# Patient Record
Sex: FEMALE | Race: WHITE | NOT HISPANIC OR LATINO | Employment: FULL TIME | URBAN - METROPOLITAN AREA
[De-identification: names, ages, dates, MRNs, and addresses within clinical notes are randomized per-mention and may not be internally consistent; named-entity substitution may affect disease eponyms.]

---

## 2017-01-11 ENCOUNTER — PROCEDURE VISIT (OUTPATIENT)
Dept: NEUROLOGY | Facility: CLINIC | Age: 57
End: 2017-01-11
Payer: COMMERCIAL

## 2017-01-11 VITALS
HEIGHT: 61 IN | SYSTOLIC BLOOD PRESSURE: 126 MMHG | BODY MASS INDEX: 29.38 KG/M2 | WEIGHT: 155.63 LBS | HEART RATE: 68 BPM | DIASTOLIC BLOOD PRESSURE: 82 MMHG

## 2017-01-11 DIAGNOSIS — G51.39 HEMIFACIAL SPASM: Primary | ICD-10-CM

## 2017-01-11 PROCEDURE — 64612 DESTROY NERVE FACE MUSCLE: CPT | Mod: LT,S$GLB,, | Performed by: PSYCHIATRY & NEUROLOGY

## 2017-01-11 NOTE — PROCEDURES
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  Last injections in august were "the best so far".       BOTOX injections are medically necessary for this patient, due to effect on the patient's quality of life, causing physical discomfort, social embarrassment, and disruption of occupational and daily activities.  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  Anticholinergics (artane) and benzodiazepines (klonopin) have not been effective.       PHYSICAL EXAM (FOCUSED):    Vitals:    01/11/17 1602   BP: 126/82   Pulse: 68   Weight: 70.6 kg (155 lb 10.3 oz)   Height: 5' 1" (1.549 m)     healthy, alert, no distress  Left hemifacial spasm that occurs multiple times/ minute during visit with complete left eye closure.  Mouth pulls to her left.    ASSESSMENT:  Left hemifacial spasm, appropriate for injections.    PLAN/PROCEDURE:  After explaining the most frequently reported adverse reactions in patients with primary blepharospasm and facial twitching following Botox injections were ptosis (20.8%), superficial punctate keratitis (6.3%) and eye dryness (6.3%). I answered all the patient's questions, and consent was obtained.     The goal of the injections will be to reduce spasms.  Using a 30 gauge injection needle and aseptic technique the following muscles were identified and injected with Botox.     BOTOX INJECTION PROCEDURE:   Dilution: 1ML 0.9% NORMAL SALINE  UNITS BOTOX    For hemifacial spasm: I injected a total of 35 units with 25 units around left eye (6 injections), and 5 units in left zygomaticus, 5 units in left upper lip.                    Patient supplied botulinum toxin?  no  Total amount of toxin used:  35 units  Total amount of toxin wasted:  65 " units

## 2017-04-27 ENCOUNTER — OFFICE VISIT (OUTPATIENT)
Dept: FAMILY MEDICINE | Facility: CLINIC | Age: 57
End: 2017-04-27
Payer: COMMERCIAL

## 2017-04-27 VITALS
SYSTOLIC BLOOD PRESSURE: 130 MMHG | HEART RATE: 88 BPM | TEMPERATURE: 99 F | DIASTOLIC BLOOD PRESSURE: 68 MMHG | WEIGHT: 156.31 LBS | OXYGEN SATURATION: 98 % | HEIGHT: 60 IN | BODY MASS INDEX: 30.69 KG/M2

## 2017-04-27 DIAGNOSIS — J01.90 ACUTE BACTERIAL RHINOSINUSITIS: Primary | ICD-10-CM

## 2017-04-27 DIAGNOSIS — B96.89 ACUTE BACTERIAL RHINOSINUSITIS: Primary | ICD-10-CM

## 2017-04-27 PROCEDURE — 99214 OFFICE O/P EST MOD 30 MIN: CPT | Mod: 25,S$GLB,, | Performed by: NURSE PRACTITIONER

## 2017-04-27 PROCEDURE — 96372 THER/PROPH/DIAG INJ SC/IM: CPT | Mod: S$GLB,,, | Performed by: NURSE PRACTITIONER

## 2017-04-27 PROCEDURE — 1160F RVW MEDS BY RX/DR IN RCRD: CPT | Mod: S$GLB,,, | Performed by: NURSE PRACTITIONER

## 2017-04-27 PROCEDURE — 99999 PR PBB SHADOW E&M-EST. PATIENT-LVL III: CPT | Mod: PBBFAC,,, | Performed by: NURSE PRACTITIONER

## 2017-04-27 RX ORDER — AZITHROMYCIN 250 MG/1
TABLET, FILM COATED ORAL
Qty: 6 TABLET | Refills: 0 | Status: SHIPPED | OUTPATIENT
Start: 2017-04-27 | End: 2017-05-02

## 2017-04-27 RX ORDER — METHYLPREDNISOLONE 4 MG/1
TABLET ORAL
Qty: 1 PACKAGE | Refills: 0 | Status: SHIPPED | OUTPATIENT
Start: 2017-04-27 | End: 2018-05-02

## 2017-04-27 RX ORDER — KETOROLAC TROMETHAMINE 30 MG/ML
60 INJECTION, SOLUTION INTRAMUSCULAR; INTRAVENOUS
Status: COMPLETED | OUTPATIENT
Start: 2017-04-27 | End: 2017-04-27

## 2017-04-27 RX ADMIN — KETOROLAC TROMETHAMINE 60 MG: 30 INJECTION, SOLUTION INTRAMUSCULAR; INTRAVENOUS at 05:04

## 2017-04-27 NOTE — PROGRESS NOTES
Patient Name: Vonda Driscoll    : 1960  MRN: [id number]    Subjective:  Vonda is a 57 y.o. female who presents today for     1. Sore throat, headache, nausea, sob,  Runny nose, stuffy nose resolved with afrin, also tried   claritin, sudafed, advil cough and sinus , ibuprofen    Past Medical History  Past Medical History:   Diagnosis Date    Allergy     Glaucoma     Hemifacial spasm 2005    left    Hyperlipemia        Past Surgical History  No past surgical history on file.    Family History  Family History   Problem Relation Age of Onset    Melanoma Neg Hx        Social History  Social History     Social History    Marital status:      Spouse name: N/A    Number of children: N/A    Years of education: N/A     Occupational History    . [employer]     Social History Main Topics    Smoking status: Never Smoker    Smokeless tobacco: Never Used    Alcohol use No      Comment: occassionally    Drug use: Not on file    Sexual activity: Not on file     Other Topics Concern    Are You Pregnant Or Think You May Be? No    Breast-Feeding No     Social History Narrative       Allergies  Review of patient's allergies indicates:   Allergen Reactions    Penicillins Itching    -reviewed and updated      Medications  Reviewed and updated.   Current Outpatient Prescriptions   Medication Sig Dispense Refill    alprazolam (XANAX) 0.25 MG tablet Take 1 tablet (0.25 mg total) by mouth 2 (two) times daily as needed for Anxiety. 60 tablet 4    lovastatin (MEVACOR) 20 MG tablet       LUMIGAN 0.01 % Drop       azithromycin (Z-DANA) 250 MG tablet Take 2 tablets by mouth on day 1; Take 1 tablet by mouth on days 2-5 6 tablet 0    methylPREDNISolone (MEDROL DOSEPACK) 4 mg tablet use as directed 1 Package 0     Current Facility-Administered Medications   Medication Dose Route Frequency Provider Last Rate Last Dose    onabotulinumtoxina injection 100 Units  1 vial Intramuscular Q90 Days Beth Mayorga MD    100 Units at 01/11/17 1628         Review of Systems   Constitutional: Negative for chills and fever.   HENT: Positive for congestion (improving) and sore throat. Negative for ear pain.    Respiratory: Positive for cough and shortness of breath.    Cardiovascular: Negative for chest pain.   Neurological: Positive for headaches.         Physical Exam  /68 (BP Location: Left arm, Patient Position: Sitting, BP Method: Manual)  Pulse 88  Temp 98.5 °F (36.9 °C) (Oral)   Ht 5' (1.524 m)  Wt 70.9 kg (156 lb 4.9 oz)  LMP 04/01/2017  SpO2 98%  BMI 30.53 kg/m2  Physical Exam   Constitutional: She appears well-developed. No distress.   HENT:   Head: Normocephalic.   Right Ear: A middle ear effusion is present.   Left Ear: A middle ear effusion is present.   Nose: Mucosal edema present. Right sinus exhibits maxillary sinus tenderness.   Mouth/Throat: No posterior oropharyngeal edema or posterior oropharyngeal erythema.   Cardiovascular: Normal rate, regular rhythm and normal heart sounds.    Pulmonary/Chest: Effort normal and breath sounds normal.   Skin: She is not diaphoretic.         Assessment/Plan:  Vonda Driscoll is a 57 y.o. female who presents today for :    Acute bacterial rhinosinusitis  Hydration, continue symptom management  -     methylPREDNISolone (MEDROL DOSEPACK) 4 mg tablet; use as directed  Dispense: 1 Package; Refill: 0  -     azithromycin (Z-DANA) 250 MG tablet; Take 2 tablets by mouth on day 1; Take 1 tablet by mouth on days 2-5  Dispense: 6 tablet; Refill: 0  -     ketorolac injection 60 mg; Inject 2 mLs (60 mg total) into the muscle one time.      Return if symptoms worsen or fail to improve in 3-5 days.

## 2017-04-27 NOTE — MR AVS SNAPSHOT
[facility]  [address]  [place] [address]  Phone: [phone]  Fax: [phone]                  Vonda Driscoll   2017 5:00 PM   Office Visit    Description:  Female : 1960   Provider:  Pili Brooks NP   Department:  [facility]           Reason for Visit     Sore Throat     Nasal Congestion     Nausea     Headache           Diagnoses this Visit        Comments    Acute bacterial rhinosinusitis    -  Primary            To Do List           Goals (5 Years of Data)     None      Follow-Up and Disposition     Return if symptoms worsen or fail to improve in 3-5 days.       These Medications        Disp Refills Start End    methylPREDNISolone (MEDROL DOSEPACK) 4 mg tablet 1 Package 0 2017     use as directed    Pharmacy: [facility] - [place], [place] - [address] Ph #: [phone]       azithromycin (Z-DANA) 250 MG tablet 6 tablet 0 2017    Take 2 tablets by mouth on day 1; Take 1 tablet by mouth on days 2-5    Pharmacy: [address], [place] [address] Ph #: [phone]         [facility]     [facility] [place] [facility] -  Assistance  Unless otherwise directed by your provider, please contact [facility], our nurse care line that is available for  assistance.     Registered nurses in the [facility] provide: appointment scheduling, clinical advisement, health education, and other advisory services.  Call: [phone] (toll free)               Medications           Message regarding Medications     Verify the changes and/or additions to your medication regime listed below are the same as discussed with your clinician today.  If any of these changes or additions are incorrect, please notify your healthcare provider.        START taking these NEW medications        Refills    methylPREDNISolone (MEDROL DOSEPACK) 4 mg tablet 0    Sig: use as  directed    Class: Normal    azithromycin (Z-DANA) 250 MG tablet 0    Sig: Take 2 tablets by mouth on day 1; Take 1 tablet by mouth on days 2-5    Class: Normal      These medications were administered today        Dose Freq    ketorolac injection 60 mg 60 mg Clinic/HOD 1 time    Sig: Inject 2 mLs (60 mg total) into the muscle one time.    Class: Normal    Route: Intramuscular           Verify that the below list of medications is an accurate representation of the medications you are currently taking.  If none reported, the list may be blank. If incorrect, please contact your healthcare provider. Carry this list with you in case of emergency.           Current Medications     alprazolam (XANAX) 0.25 MG tablet Take 1 tablet (0.25 mg total) by mouth 2 (two) times daily as needed for Anxiety.    lovastatin (MEVACOR) 20 MG tablet     LUMIGAN 0.01 % Drop     azithromycin (Z-DANA) 250 MG tablet Take 2 tablets by mouth on day 1; Take 1 tablet by mouth on days 2-5    methylPREDNISolone (MEDROL DOSEPACK) 4 mg tablet use as directed           Clinical Reference Information           Your Vitals Were     BP Pulse Temp Height Weight Last Period    130/68 (BP Location: Left arm, Patient Position: Sitting, BP Method: Manual) 88 98.5 °F (36.9 °C) (Oral) 5' (1.524 m) 70.9 kg (156 lb 4.9 oz) 04/01/2017    SpO2 BMI             98% 30.53 kg/m2         Blood Pressure          Most Recent Value    BP  130/68      Allergies as of 4/27/2017     Penicillins      Immunizations Administered on Date of Encounter - 4/27/2017     None      Language Assistance Services     ATTENTION: Language assistance services are available, free of charge. Please call [phone].      ATENCIÓN: Si habla roberto, tiene a fontaine disposición servicios gratuitos de asistencia lingüística. Llame al [phone].     CHÚ Ý: N?u b?n nói Ti?ng Vi?t, có các d?ch v? h? tr? ngôn ng? mi?n phí dành cho b?n. G?i s? [phone].         [facility] complies  with applicable Federal civil rights laws and does not discriminate on the basis of race, color, national origin, age, disability, or sex.

## 2017-06-05 ENCOUNTER — PATIENT MESSAGE (OUTPATIENT)
Dept: NEUROLOGY | Facility: CLINIC | Age: 57
End: 2017-06-05

## 2017-06-20 ENCOUNTER — TELEPHONE (OUTPATIENT)
Dept: NEUROLOGY | Facility: CLINIC | Age: 57
End: 2017-06-20

## 2017-06-20 NOTE — TELEPHONE ENCOUNTER
----- Message from Ashely Rubio sent at 6/20/2017  9:21 AM CDT -----  Contact: patient  Patient needs to speak with the nurse about coming in for an injection for Botox.  Patient stated that she's tried twice by [facility] to get an appointment but no one responds to her & she was not sure why.  Patient's # is [phone]

## 2017-06-28 ENCOUNTER — PROCEDURE VISIT (OUTPATIENT)
Dept: NEUROLOGY | Facility: CLINIC | Age: 57
End: 2017-06-28
Payer: COMMERCIAL

## 2017-06-28 VITALS
WEIGHT: 160.25 LBS | SYSTOLIC BLOOD PRESSURE: 141 MMHG | HEIGHT: 60 IN | DIASTOLIC BLOOD PRESSURE: 92 MMHG | HEART RATE: 78 BPM | BODY MASS INDEX: 31.46 KG/M2

## 2017-06-28 DIAGNOSIS — G51.39 HEMIFACIAL SPASM: Primary | ICD-10-CM

## 2017-06-28 PROCEDURE — 64612 DESTROY NERVE FACE MUSCLE: CPT | Mod: LT,S$GLB,, | Performed by: PSYCHIATRY & NEUROLOGY

## 2017-06-28 NOTE — PROCEDURES
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  Last injections in January.    BOTOX injections are medically necessary for this patient, due to effect on the patient's quality of life, causing physical discomfort, social embarrassment, and disruption of occupational and daily activities.  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  Anticholinergics (artane) and benzodiazepines (klonopin) have not been effective.       PHYSICAL EXAM (FOCUSED):    Vitals:    06/28/17 0933   BP: (!) 141/92   Pulse: 78   Weight: 72.7 kg (160 lb 4.4 oz)   Height: 5' (1.524 m)     healthy, alert, no distress  Left hemifacial spasm that occurs multiple times/ minute during visit with complete left eye closure.  Mouth pulls to her left.    ASSESSMENT:  Left hemifacial spasm, appropriate for injections.    PLAN/PROCEDURE:  After explaining the most frequently reported adverse reactions in patients with primary blepharospasm and facial twitching following Botox injections were ptosis (20.8%), superficial punctate keratitis (6.3%) and eye dryness (6.3%). I answered all the patient's questions, and consent was obtained.     The goal of the injections will be to reduce spasms.  Using a 30 gauge injection needle and aseptic technique the following muscles were identified and injected with Botox.     BOTOX INJECTION PROCEDURE:   Dilution: 1ML 0.9% NORMAL SALINE  UNITS BOTOX    For hemifacial spasm: I injected a total of 35 units with 25 units around left eye (6 injections), and 5 units in left zygomaticus, 5 units in left upper lip.                    Patient supplied botulinum toxin?  no  Total amount of toxin used:  35 units  Total amount of toxin wasted:  65 units

## 2017-09-25 ENCOUNTER — TELEPHONE (OUTPATIENT)
Dept: NEUROLOGY | Facility: CLINIC | Age: 57
End: 2017-09-25

## 2017-09-25 NOTE — TELEPHONE ENCOUNTER
----- Message from Bryce Mays sent at 9/25/2017  3:27 PM CDT -----  Contact: Self @ [phone]  Pt would like to schedule botox injection. Pls call.

## 2017-10-05 ENCOUNTER — PATIENT MESSAGE (OUTPATIENT)
Dept: NEUROLOGY | Facility: CLINIC | Age: 57
End: 2017-10-05

## 2017-10-09 ENCOUNTER — PATIENT MESSAGE (OUTPATIENT)
Dept: NEUROLOGY | Facility: CLINIC | Age: 57
End: 2017-10-09

## 2017-10-19 ENCOUNTER — PROCEDURE VISIT (OUTPATIENT)
Dept: NEUROLOGY | Facility: CLINIC | Age: 57
End: 2017-10-19
Payer: COMMERCIAL

## 2017-10-19 VITALS
WEIGHT: 152.31 LBS | HEART RATE: 73 BPM | HEIGHT: 61 IN | BODY MASS INDEX: 28.76 KG/M2 | SYSTOLIC BLOOD PRESSURE: 132 MMHG | DIASTOLIC BLOOD PRESSURE: 85 MMHG

## 2017-10-19 DIAGNOSIS — G51.39 HEMIFACIAL SPASM: Primary | ICD-10-CM

## 2017-10-19 PROCEDURE — 64612 DESTROY NERVE FACE MUSCLE: CPT | Mod: LT,S$GLB,, | Performed by: PSYCHIATRY & NEUROLOGY

## 2017-10-19 RX ORDER — TRAVOPROST 0.04 MG/ML
0 SOLUTION/ DROPS OPHTHALMIC DAILY
COMMUNITY
Start: 2017-08-08 | End: 2020-04-15

## 2017-10-19 NOTE — PROCEDURES
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  Last injections in June.    BOTOX injections are medically necessary for this patient, due to effect on the patient's quality of life, causing physical discomfort, social embarrassment, and disruption of occupational and daily activities.  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  Anticholinergics (artane) and benzodiazepines (klonopin) have not been effective.       PHYSICAL EXAM (FOCUSED):    Vitals:    10/19/17 1608   BP: 132/85   Pulse: 73   Weight: 69.1 kg (152 lb 5.4 oz)   Height: 5' 1" (1.549 m)     healthy, alert, no distress  Left hemifacial spasm that occurs multiple times/ minute during visit with complete left eye closure.  Mouth pulls to her left.    ASSESSMENT:  Left hemifacial spasm, appropriate for injections.    PLAN/PROCEDURE:  After explaining the most frequently reported adverse reactions in patients with primary blepharospasm and facial twitching following Botox injections were ptosis (20.8%), superficial punctate keratitis (6.3%) and eye dryness (6.3%). I answered all the patient's questions, and consent was obtained.     The goal of the injections will be to reduce spasms.  Using a 30 gauge injection needle and aseptic technique the following muscles were identified and injected with Botox.     BOTOX INJECTION PROCEDURE:   Dilution: 1ML 0.9% NORMAL SALINE  UNITS BOTOX    For hemifacial spasm: I injected a total of 35 units with 20 units around left eye (5 injections), and 5 units in left zygomaticus, 2 units in left upper lip, 3 in lower lip and 5 in nasalis.                        Patient supplied botulinum toxin?  no  Total amount of toxin used:  35 units  Total amount of toxin wasted:  65 " units

## 2018-01-17 ENCOUNTER — TELEPHONE (OUTPATIENT)
Dept: NEUROLOGY | Facility: CLINIC | Age: 58
End: 2018-01-17

## 2018-01-17 NOTE — TELEPHONE ENCOUNTER
Pt has accepted a r/s appt for 1/30/18 @ 4pm with Dr. Mayorga for Botox. Sooner appt offered while Dr. Mayorga on rounds but patient needed appt later in the day. New appt letter mailed.

## 2018-01-30 ENCOUNTER — TELEPHONE (OUTPATIENT)
Dept: NEUROLOGY | Facility: CLINIC | Age: 58
End: 2018-01-30

## 2018-01-30 ENCOUNTER — PROCEDURE VISIT (OUTPATIENT)
Dept: NEUROLOGY | Facility: CLINIC | Age: 58
End: 2018-01-30
Payer: COMMERCIAL

## 2018-01-30 VITALS
WEIGHT: 154.13 LBS | SYSTOLIC BLOOD PRESSURE: 131 MMHG | BODY MASS INDEX: 29.1 KG/M2 | DIASTOLIC BLOOD PRESSURE: 90 MMHG | HEART RATE: 83 BPM | HEIGHT: 61 IN

## 2018-01-30 DIAGNOSIS — G51.39 HEMIFACIAL SPASM: Primary | ICD-10-CM

## 2018-01-30 PROCEDURE — 64612 DESTROY NERVE FACE MUSCLE: CPT | Mod: LT,S$GLB,, | Performed by: PSYCHIATRY & NEUROLOGY

## 2018-01-30 NOTE — TELEPHONE ENCOUNTER
came into the clinic today for her Botox appt with . She stated that she would like her next appt to not be at the end of the month, but still wants it at the end of the day.

## 2018-01-30 NOTE — PROCEDURES
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  Last injections in October and worked well, though she's reluctant to get too much around mouth as it distorts her smile.    BOTOX injections are medically necessary for this patient, due to effect on the patient's quality of life, causing physical discomfort, social embarrassment, and disruption of occupational and daily activities.  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  Anticholinergics (artane) and benzodiazepines (klonopin) have not been effective.       PHYSICAL EXAM (FOCUSED):    Vitals:    01/30/18 1616   BP: (!) 131/90   Pulse: 83   Weight: 69.9 kg (154 lb 1.6 oz)   Height: 5' 1" (1.549 m)     healthy, alert, no distress  Left hemifacial spasm that occurs multiple times/ minute during visit with complete left eye closure.  Mouth pulls to her left.    ASSESSMENT:  Left hemifacial spasm, appropriate for injections.    PLAN/PROCEDURE:  After explaining the most frequently reported adverse reactions in patients with primary blepharospasm and facial twitching following Botox injections were ptosis (20.8%), superficial punctate keratitis (6.3%) and eye dryness (6.3%). I answered all the patient's questions, and consent was obtained.     The goal of the injections will be to reduce spasms.  Using a 30 gauge injection needle and aseptic technique the following muscles were identified and injected with Botox.     BOTOX INJECTION PROCEDURE:   Dilution: 1ML 0.9% NORMAL SALINE  UNITS BOTOX    For hemifacial spasm: I injected a total of 30 units with 25 units around left eye (5 injections), and 5 units in left upper lip.                            Patient supplied botulinum toxin?  no  Total amount of toxin used:  30 units  Total " amount of toxin wasted:  70 units

## 2018-02-05 ENCOUNTER — TELEPHONE (OUTPATIENT)
Dept: NEUROLOGY | Facility: CLINIC | Age: 58
End: 2018-02-05

## 2018-05-02 ENCOUNTER — PROCEDURE VISIT (OUTPATIENT)
Dept: NEUROLOGY | Facility: CLINIC | Age: 58
End: 2018-05-02
Payer: COMMERCIAL

## 2018-05-02 VITALS
HEIGHT: 61 IN | WEIGHT: 156.75 LBS | HEART RATE: 80 BPM | DIASTOLIC BLOOD PRESSURE: 84 MMHG | SYSTOLIC BLOOD PRESSURE: 134 MMHG | BODY MASS INDEX: 29.59 KG/M2

## 2018-05-02 DIAGNOSIS — G51.39 HEMIFACIAL SPASM: Primary | ICD-10-CM

## 2018-05-02 PROCEDURE — 64612 DESTROY NERVE FACE MUSCLE: CPT | Mod: LT,S$GLB,, | Performed by: PSYCHIATRY & NEUROLOGY

## 2018-05-02 NOTE — PROCEDURES
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  Last injections in January and worked well, though she's reluctant to get too much around mouth as it distorts her smile.    BOTOX injections are medically necessary for this patient, due to effect on the patient's quality of life, causing physical discomfort, social embarrassment, and disruption of occupational and daily activities.  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  Anticholinergics (artane) and benzodiazepines (klonopin) have not been effective.       PHYSICAL EXAM (FOCUSED):    Vitals:    05/02/18 1555   BP: 134/84   Pulse: 80   Weight: 71.1 kg (156 lb 12 oz)   Height: 5' 1" (1.549 m)     healthy, alert, no distress  Left hemifacial spasm that occurs multiple times/ minute during visit with complete left eye closure.  Mouth pulls to her left.    ASSESSMENT:  Left hemifacial spasm, appropriate for injections.    PLAN/PROCEDURE:  After explaining the most frequently reported adverse reactions in patients with primary blepharospasm and facial twitching following Botox injections were ptosis (20.8%), superficial punctate keratitis (6.3%) and eye dryness (6.3%). I answered all the patient's questions, and consent was obtained.     The goal of the injections will be to reduce spasms.  Using a 30 gauge injection needle and aseptic technique the following muscles were identified and injected with Botox.     BOTOX INJECTION PROCEDURE:   Dilution: 1ML 0.9% NORMAL SALINE  UNITS BOTOX    For hemifacial spasm: I injected a total of 30 units with 25 units around left eye (5 injections), and 5 units in left upper lip.                            Patient supplied botulinum toxin?  no  Total amount of toxin used:  30 units  Total " amount of toxin wasted:  70 units

## 2018-08-15 ENCOUNTER — PROCEDURE VISIT (OUTPATIENT)
Dept: NEUROLOGY | Facility: CLINIC | Age: 58
End: 2018-08-15
Payer: COMMERCIAL

## 2018-08-15 VITALS
SYSTOLIC BLOOD PRESSURE: 136 MMHG | HEIGHT: 61 IN | DIASTOLIC BLOOD PRESSURE: 89 MMHG | HEART RATE: 78 BPM | BODY MASS INDEX: 29.18 KG/M2 | WEIGHT: 154.56 LBS

## 2018-08-15 DIAGNOSIS — G51.39 HEMIFACIAL SPASM: Primary | ICD-10-CM

## 2018-08-15 PROCEDURE — 64612 DESTROY NERVE FACE MUSCLE: CPT | Mod: LT,S$GLB,, | Performed by: PSYCHIATRY & NEUROLOGY

## 2018-08-15 RX ORDER — DIAZEPAM 2 MG/1
2 TABLET ORAL
Qty: 3 TABLET | Refills: 0 | Status: SHIPPED | OUTPATIENT
Start: 2018-08-15 | End: 2019-04-03 | Stop reason: SDUPTHER

## 2018-08-15 NOTE — PROCEDURES
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  Last injections in May did not work as well as prior injections in January (both were 40 units).    BOTOX injections are medically necessary for this patient, due to effect on the patient's quality of life, causing physical discomfort, social embarrassment, and disruption of occupational and daily activities.  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  Anticholinergics (artane) and benzodiazepines (klonopin) have not been effective.       PHYSICAL EXAM (FOCUSED):    Vitals:    08/15/18 1559   BP: 136/89   Pulse: 78   Weight: 70.1 kg (154 lb 8.7 oz)   Height: 5' 1" (1.549 m)     healthy, alert, no distress  Left hemifacial spasm that occurs multiple times/ minute during visit with complete left eye closure.  Mouth pulls to her left.    ASSESSMENT:  Left hemifacial spasm, appropriate for injections.    PLAN/PROCEDURE:  After explaining the most frequently reported adverse reactions in patients with primary blepharospasm and facial twitching following Botox injections were ptosis (20.8%), superficial punctate keratitis (6.3%) and eye dryness (6.3%). I answered all the patient's questions, and consent was obtained.     The goal of the injections will be to reduce spasms.  Using a 30 gauge injection needle and aseptic technique the following muscles were identified and injected with Botox.     BOTOX INJECTION PROCEDURE:   Dilution: 1ML 0.9% NORMAL SALINE  UNITS BOTOX    For hemifacial spasm: I injected a total of 40 units distributed around left eye.                                    Patient supplied botulinum toxin?  no  Total amount of toxin used:  40 units  Total amount of toxin wasted:  60 units                          "

## 2018-11-13 ENCOUNTER — TELEPHONE (OUTPATIENT)
Dept: NEUROLOGY | Facility: CLINIC | Age: 58
End: 2018-11-13

## 2018-11-13 DIAGNOSIS — G51.39 HEMIFACIAL SPASM: Primary | ICD-10-CM

## 2018-11-13 NOTE — TELEPHONE ENCOUNTER
Call placed to patient that appointment will need to be rescheduled due to insurance requiring more time to approve medical necessity for Botox. Left message for return call to office.

## 2018-11-14 ENCOUNTER — TELEPHONE (OUTPATIENT)
Dept: NEUROLOGY | Facility: CLINIC | Age: 58
End: 2018-11-14

## 2018-11-14 NOTE — TELEPHONE ENCOUNTER
Late Entry -- 08:25 AM - Call received from patient, advised patient Botox case still in approval process by insurance company as more information was needed. Advised patient as soon as approved, Dr. Mayorga will fit patient in for injections. Patient agreed and verbalized understanding.

## 2018-12-01 ENCOUNTER — PATIENT MESSAGE (OUTPATIENT)
Dept: NEUROLOGY | Facility: CLINIC | Age: 58
End: 2018-12-01

## 2018-12-04 ENCOUNTER — PATIENT MESSAGE (OUTPATIENT)
Dept: NEUROLOGY | Facility: CLINIC | Age: 58
End: 2018-12-04

## 2018-12-17 ENCOUNTER — PROCEDURE VISIT (OUTPATIENT)
Dept: NEUROLOGY | Facility: CLINIC | Age: 58
End: 2018-12-17
Payer: COMMERCIAL

## 2018-12-17 VITALS
HEIGHT: 61 IN | BODY MASS INDEX: 30.26 KG/M2 | WEIGHT: 160.25 LBS | SYSTOLIC BLOOD PRESSURE: 146 MMHG | HEART RATE: 65 BPM | DIASTOLIC BLOOD PRESSURE: 90 MMHG

## 2018-12-17 DIAGNOSIS — G51.39 HEMIFACIAL SPASM: Primary | ICD-10-CM

## 2018-12-17 PROCEDURE — 64612 DESTROY NERVE FACE MUSCLE: CPT | Mod: RT,S$GLB,, | Performed by: PSYCHIATRY & NEUROLOGY

## 2018-12-17 RX ORDER — PRAVASTATIN SODIUM 20 MG/1
20 TABLET ORAL DAILY
COMMUNITY
Start: 2018-12-10

## 2018-12-17 NOTE — PROCEDURES
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  Last injections in September.    BOTOX injections are medically necessary for this patient, due to effect on the patient's quality of life, causing physical discomfort, social embarrassment, and disruption of occupational and daily activities.  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  Anticholinergics (artane) and benzodiazepines (klonopin) have not been effective.       PHYSICAL EXAM (FOCUSED):    There were no vitals filed for this visit.  healthy, alert, no distress  Left hemifacial spasm that occurs multiple times/ minute during visit with complete left eye closure.  Mouth pulls to her left.    ASSESSMENT:  Left hemifacial spasm, appropriate for injections.    PLAN/PROCEDURE:  After explaining the most frequently reported adverse reactions in patients with primary blepharospasm and facial twitching following Botox injections were ptosis (20.8%), superficial punctate keratitis (6.3%) and eye dryness (6.3%). I answered all the patient's questions, and consent was obtained.     The goal of the injections will be to reduce spasms.  Using a 30 gauge injection needle and aseptic technique the following muscles were identified and injected with Botox.     BOTOX INJECTION PROCEDURE:   Dilution: 1ML 0.9% NORMAL SALINE  UNITS BOTOX    For hemifacial spasm: I injected a total of 35 units distributed around left eye.                                    Patient supplied botulinum toxin?  no  Total amount of toxin used:  35 units  Total amount of toxin wasted:  65 units

## 2019-01-17 ENCOUNTER — PATIENT MESSAGE (OUTPATIENT)
Dept: NEUROLOGY | Facility: CLINIC | Age: 59
End: 2019-01-17

## 2019-04-03 ENCOUNTER — PROCEDURE VISIT (OUTPATIENT)
Dept: NEUROLOGY | Facility: CLINIC | Age: 59
End: 2019-04-03
Payer: COMMERCIAL

## 2019-04-03 VITALS
DIASTOLIC BLOOD PRESSURE: 91 MMHG | HEART RATE: 80 BPM | SYSTOLIC BLOOD PRESSURE: 132 MMHG | WEIGHT: 160.94 LBS | BODY MASS INDEX: 30.39 KG/M2 | HEIGHT: 61 IN

## 2019-04-03 DIAGNOSIS — G51.39 HEMIFACIAL SPASM: Primary | ICD-10-CM

## 2019-04-03 PROCEDURE — 64612 DESTROY NERVE FACE MUSCLE: CPT | Mod: LT,S$GLB,, | Performed by: PSYCHIATRY & NEUROLOGY

## 2019-04-03 PROCEDURE — 64612 PR DEST,NERVE,FACIAL: ICD-10-PCS | Mod: LT,S$GLB,, | Performed by: PSYCHIATRY & NEUROLOGY

## 2019-04-03 RX ORDER — DIAZEPAM 5 MG/1
5 TABLET ORAL
Qty: 3 TABLET | Refills: 0 | Status: SHIPPED | OUTPATIENT
Start: 2019-04-03 | End: 2019-07-02

## 2019-04-03 NOTE — PROGRESS NOTES
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  Last injections in December.    BOTOX injections are medically necessary for this patient, due to effect on the patient's quality of life, causing physical discomfort, social embarrassment, and disruption of occupational and daily activities.  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  Anticholinergics (artane) and benzodiazepines (klonopin) have not been effective.     MRI brain w/wo contrast 3/19/19:  Small vessel disease      PHYSICAL EXAM (FOCUSED):    There were no vitals filed for this visit.  healthy, alert, no distress  Left hemifacial spasm that occurs multiple times/ minute during visit with complete left eye closure.  Mouth pulls to her left.    ASSESSMENT:  Left hemifacial spasm, appropriate for injections.    PLAN/PROCEDURE:  After explaining the most frequently reported adverse reactions in patients with primary blepharospasm and facial twitching following Botox injections were ptosis (20.8%), superficial punctate keratitis (6.3%) and eye dryness (6.3%). I answered all the patient's questions, and consent was obtained.     The goal of the injections will be to reduce spasms.  Using a 30 gauge injection needle and aseptic technique the following muscles were identified and injected with Botox.     BOTOX INJECTION PROCEDURE:   Dilution: 1ML 0.9% NORMAL SALINE  UNITS BOTOX    For hemifacial spasm: I injected a total of 35 units distributed around left eye.                                    Patient supplied botulinum toxin?  no  Total amount of toxin used:  35 units  Total amount of toxin wasted:  65 units

## 2019-04-03 NOTE — PROCEDURES
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  Last injections in December.  Wearing off in past could weeks.    BOTOX injections are medically necessary for this patient, due to effect on the patient's quality of life, causing physical discomfort, social embarrassment, and disruption of occupational and daily activities.  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  Anticholinergics (artane) and benzodiazepines (klonopin) have not been effective.       PHYSICAL EXAM (FOCUSED):    Vitals:    04/03/19 1619   BP: (!) 132/91   Pulse: 80   Weight: 73 kg (160 lb 15 oz)   Height: 5' 1" (1.549 m)     healthy, alert, no distress  Left hemifacial spasm that occurs multiple times/ minute during visit with complete left eye closure.  Mouth pulls to her left.    ASSESSMENT:  Left hemifacial spasm, appropriate for injections.    PLAN/PROCEDURE:  After explaining the most frequently reported adverse reactions in patients with primary blepharospasm and facial twitching following Botox injections were ptosis (20.8%), superficial punctate keratitis (6.3%) and eye dryness (6.3%). I answered all the patient's questions, and consent was obtained.     The goal of the injections will be to reduce spasms.  Using a 30 gauge injection needle and aseptic technique the following muscles were identified and injected with Botox.     BOTOX INJECTION PROCEDURE:   Dilution: 1ML 0.9% NORMAL SALINE  UNITS BOTOX    For hemifacial spasm: I injected a total of 35 units distributed around left eye.                                    Patient supplied botulinum toxin?  no  Total amount of toxin used:  35 units  Total amount of toxin wasted:  65 units                          "

## 2019-06-13 ENCOUNTER — HOSPITAL ENCOUNTER (OUTPATIENT)
Dept: RADIOLOGY | Facility: HOSPITAL | Age: 59
Discharge: HOME | End: 2019-06-13
Attending: PSYCHIATRY & NEUROLOGY
Payer: COMMERCIAL

## 2019-06-13 DIAGNOSIS — G51.39 HEMIFACIAL SPASM: ICD-10-CM

## 2019-06-13 PROCEDURE — 70544 MR ANGIOGRAPHY HEAD W/O DYE: CPT | Mod: 26,,, | Performed by: RADIOLOGY

## 2019-06-13 PROCEDURE — 70544 MRA BRAIN WITHOUT CONTRAST: ICD-10-PCS | Mod: 26,,, | Performed by: RADIOLOGY

## 2019-06-13 PROCEDURE — 70544 MR ANGIOGRAPHY HEAD W/O DYE: CPT | Mod: TC

## 2019-07-02 ENCOUNTER — PROCEDURE VISIT (OUTPATIENT)
Dept: NEUROLOGY | Facility: CLINIC | Age: 59
End: 2019-07-02
Payer: COMMERCIAL

## 2019-07-02 VITALS
HEIGHT: 61 IN | SYSTOLIC BLOOD PRESSURE: 131 MMHG | WEIGHT: 161 LBS | DIASTOLIC BLOOD PRESSURE: 88 MMHG | HEART RATE: 71 BPM | BODY MASS INDEX: 30.4 KG/M2

## 2019-07-02 DIAGNOSIS — G51.39 HEMIFACIAL SPASM: Primary | ICD-10-CM

## 2019-07-02 PROCEDURE — 64612 DESTROY NERVE FACE MUSCLE: CPT | Mod: LT,S$GLB,, | Performed by: PSYCHIATRY & NEUROLOGY

## 2019-07-02 PROCEDURE — 64612 PR DEST,NERVE,FACIAL: ICD-10-PCS | Mod: LT,S$GLB,, | Performed by: PSYCHIATRY & NEUROLOGY

## 2019-07-02 NOTE — PROCEDURES
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  Last injections in APRIL.  Wearing off in past couple weeks.    BOTOX injections are medically necessary for this patient, due to effect on the patient's quality of life, causing physical discomfort, social embarrassment, and disruption of occupational and daily activities.  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  Anticholinergics (artane) and benzodiazepines (klonopin) have not been effective.       PHYSICAL EXAM (FOCUSED):    Vitals:    07/02/19 1543   BP: 131/88   Pulse: 71   Weight: 73 kg (161 lb)   Height: 5' 1" (1.549 m)     healthy, alert, no distress  Left hemifacial spasm that occurs multiple times/ minute during visit with complete left eye closure.  Mouth pulls to her left.    ASSESSMENT:  Left hemifacial spasm, appropriate for injections.    PLAN/PROCEDURE:  After explaining the most frequently reported adverse reactions in patients with primary blepharospasm and facial twitching following Botox injections were ptosis (20.8%), superficial punctate keratitis (6.3%) and eye dryness (6.3%). I answered all the patient's questions, and consent was obtained.     The goal of the injections will be to reduce spasms.  Using a 30 gauge injection needle and aseptic technique the following muscles were identified and injected with Botox.     BOTOX INJECTION PROCEDURE:   Dilution: 1ML 0.9% NORMAL SALINE  UNITS BOTOX    For hemifacial spasm: I injected a total of 30 units distributed around left eye.                                    Patient supplied botulinum toxin?  no  Total amount of toxin used:  30 units  Total amount of toxin wasted: 70 units                          "

## 2019-10-23 ENCOUNTER — PROCEDURE VISIT (OUTPATIENT)
Dept: NEUROLOGY | Facility: CLINIC | Age: 59
End: 2019-10-23
Payer: COMMERCIAL

## 2019-10-23 VITALS
HEART RATE: 79 BPM | WEIGHT: 156 LBS | BODY MASS INDEX: 29.45 KG/M2 | HEIGHT: 61 IN | DIASTOLIC BLOOD PRESSURE: 94 MMHG | SYSTOLIC BLOOD PRESSURE: 141 MMHG

## 2019-10-23 DIAGNOSIS — G51.39 HEMIFACIAL SPASM: Primary | ICD-10-CM

## 2019-10-23 PROCEDURE — 64612 PR DEST,NERVE,FACIAL: ICD-10-PCS | Mod: LT,S$GLB,, | Performed by: PSYCHIATRY & NEUROLOGY

## 2019-10-23 PROCEDURE — 64612 DESTROY NERVE FACE MUSCLE: CPT | Mod: LT,S$GLB,, | Performed by: PSYCHIATRY & NEUROLOGY

## 2019-10-23 NOTE — PROCEDURES
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  Last injections in JULY.  Wearing off in past 4-6 weeks.  Reports she is only getting about 4 weeks of benefit with injections and is starting to wonder about disability.  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    Only time I've caused ptosis was in 2014, 22 units (less than what we've been using).    BOTOX injections are medically necessary for this patient, due to effect on the patient's quality of life, causing physical discomfort, social embarrassment, and disruption of occupational and daily activities.  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  Anticholinergics (artane) and benzodiazepines (klonopin) have not been effective.       PHYSICAL EXAM (FOCUSED):    Vitals:    10/23/19 1533   BP: (!) 141/94   Pulse: 79   Weight: 70.8 kg (156 lb)   Height: 5' 1" (1.549 m)     healthy, alert, no distress  Left hemifacial spasm that occurs multiple times/ minute during visit with complete left eye closure.  Mouth pulls to her left.    ASSESSMENT:  Left hemifacial spasm, appropriate for injections.    PLAN/PROCEDURE:  After explaining the most frequently reported adverse reactions in patients with primary blepharospasm and facial twitching following Botox injections were ptosis (20.8%), superficial punctate keratitis (6.3%) and eye dryness (6.3%). I answered all the patient's questions, and consent was obtained.     The goal of the injections will be to reduce spasms.  Using a 30 gauge injection needle and aseptic technique the following muscles were identified and injected with Botox.     BOTOX INJECTION PROCEDURE:   Dilution: 1ML 0.9% NORMAL SALINE  UNITS BOTOX    For hemifacial " spasm: I injected a total of 35 units distributed around left eye, 5 units left cheek (risoris).                  Patient supplied botulinum toxin?  no  Total amount of toxin used:  40 units  Total amount of toxin wasted: 60 units  Agree with patient's application for disability, once she is ready, given the difficulty with working on computer.

## 2020-01-15 DIAGNOSIS — G51.39 HEMIFACIAL SPASM: Primary | ICD-10-CM

## 2020-01-22 ENCOUNTER — PROCEDURE VISIT (OUTPATIENT)
Dept: NEUROLOGY | Facility: CLINIC | Age: 60
End: 2020-01-22
Payer: COMMERCIAL

## 2020-01-22 VITALS
SYSTOLIC BLOOD PRESSURE: 133 MMHG | BODY MASS INDEX: 29.48 KG/M2 | HEART RATE: 78 BPM | DIASTOLIC BLOOD PRESSURE: 88 MMHG | HEIGHT: 61 IN

## 2020-01-22 DIAGNOSIS — G51.39 HEMIFACIAL SPASM: Primary | ICD-10-CM

## 2020-01-22 PROCEDURE — 64612 PR DEST,NERVE,FACIAL: ICD-10-PCS | Mod: LT,S$GLB,, | Performed by: PSYCHIATRY & NEUROLOGY

## 2020-01-22 PROCEDURE — 64612 DESTROY NERVE FACE MUSCLE: CPT | Mod: LT,S$GLB,, | Performed by: PSYCHIATRY & NEUROLOGY

## 2020-01-22 NOTE — PROCEDURES
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  Last injections in OCTOBER.  Wearing off in past 4-6 weeks.  Reports she is only getting about 4 weeks of benefit with injections and is starting to wonder about disability.  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    Only time I've caused ptosis was in 2014, 22 units (less than what we've been using).    BOTOX injections are medically necessary for this patient, due to effect on the patient's quality of life, causing physical discomfort, social embarrassment, and disruption of occupational and daily activities.  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  Anticholinergics (artane) and benzodiazepines (klonopin) have not been effective.       PHYSICAL EXAM (FOCUSED):    Vitals:    01/22/20 1542   BP: 133/88   Pulse: 78   Height: 5' 1" (1.549 m)     healthy, alert, no distress  Left hemifacial spasm that occurs multiple times/ minute during visit with complete left eye closure.  Mouth pulls to her left.    ASSESSMENT:  Left hemifacial spasm, appropriate for injections.    PLAN/PROCEDURE:  After explaining the most frequently reported adverse reactions in patients with primary blepharospasm and facial twitching following Botox injections were ptosis (20.8%), superficial punctate keratitis (6.3%) and eye dryness (6.3%). I answered all the patient's questions, and consent was obtained.     The goal of the injections will be to reduce spasms.  Using a 30 gauge injection needle and aseptic technique the following muscles were identified and injected with Botox.     BOTOX INJECTION PROCEDURE:   Dilution: 1ML 0.9% NORMAL SALINE  UNITS BOTOX    For hemifacial spasm: I injected a total of 25 " units distributed around left eye, 5 units left cheek (risoris).          Patient supplied botulinum toxin?  no  Total amount of toxin used:  30 units  Total amount of toxin wasted: 70 units

## 2020-04-07 ENCOUNTER — TELEPHONE (OUTPATIENT)
Dept: NEUROLOGY | Facility: CLINIC | Age: 60
End: 2020-04-07

## 2020-04-07 NOTE — TELEPHONE ENCOUNTER
Called and spoke to  regarding her botox appt with . Changed to [facility]

## 2020-04-15 ENCOUNTER — OFFICE VISIT (OUTPATIENT)
Dept: PHYSICAL MEDICINE AND REHAB | Facility: CLINIC | Age: 60
End: 2020-04-15
Payer: COMMERCIAL

## 2020-04-15 VITALS
SYSTOLIC BLOOD PRESSURE: 159 MMHG | HEIGHT: 61 IN | DIASTOLIC BLOOD PRESSURE: 91 MMHG | HEART RATE: 74 BPM | BODY MASS INDEX: 29.48 KG/M2

## 2020-04-15 DIAGNOSIS — G51.39 HEMIFACIAL SPASM: Primary | ICD-10-CM

## 2020-04-15 PROCEDURE — 99999 PR PBB SHADOW E&M-EST. PATIENT-LVL III: ICD-10-PCS | Mod: PBBFAC,,, | Performed by: PSYCHIATRY & NEUROLOGY

## 2020-04-15 PROCEDURE — 99499 UNLISTED E&M SERVICE: CPT | Mod: S$GLB,,, | Performed by: PSYCHIATRY & NEUROLOGY

## 2020-04-15 PROCEDURE — 99499 NO LOS: ICD-10-PCS | Mod: S$GLB,,, | Performed by: PSYCHIATRY & NEUROLOGY

## 2020-04-15 PROCEDURE — 64612 DESTROY NERVE FACE MUSCLE: CPT | Mod: LT,S$GLB,, | Performed by: PSYCHIATRY & NEUROLOGY

## 2020-04-15 PROCEDURE — 99999 PR PBB SHADOW E&M-EST. PATIENT-LVL III: CPT | Mod: PBBFAC,,, | Performed by: PSYCHIATRY & NEUROLOGY

## 2020-04-15 PROCEDURE — 64612 PR DEST,NERVE,FACIAL: ICD-10-PCS | Mod: LT,S$GLB,, | Performed by: PSYCHIATRY & NEUROLOGY

## 2020-04-15 NOTE — PROGRESS NOTES
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  Last injections in JANUARY.  Wearing off in past 4-6 weeks.  Reports she is only getting about 4 weeks of benefit with injections and is starting to wonder about disability.  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    Only time I've caused ptosis was in 2014, 22 units (less than what we've been using).    BOTOX injections are medically necessary for this patient, due to effect on the patient's quality of life, causing physical discomfort, social embarrassment, and disruption of occupational and daily activities.  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  Anticholinergics (artane) and benzodiazepines (klonopin) have not been effective.       PHYSICAL EXAM (FOCUSED):    Vitals:    04/15/20 1533   BP: (!) 159/91   Pulse: 74   Height: 5' 1" (1.549 m)     healthy, alert, no distress  Left hemifacial spasm that occurs multiple times/ minute during visit with complete left eye closure.  Mouth pulls to her left.    ASSESSMENT:  Left hemifacial spasm, appropriate for injections.    PLAN/PROCEDURE:  After explaining the most frequently reported adverse reactions in patients with primary blepharospasm and facial twitching following Botox injections were ptosis (20.8%), superficial punctate keratitis (6.3%) and eye dryness (6.3%). I answered all the patient's questions, and consent was obtained.     The goal of the injections will be to reduce spasms.  Using a 30 gauge injection needle and aseptic technique the following muscles were identified and injected with Botox.     BOTOX INJECTION PROCEDURE:   Dilution: 1ML 0.9% NORMAL SALINE  UNITS BOTOX    For hemifacial spasm: I injected a total of 25 " units distributed around left eye, 5 units left cheek (risoris).          Patient supplied botulinum toxin?  no  Total amount of toxin used:  30 units  Total amount of toxin wasted: 70 units

## 2020-07-14 ENCOUNTER — PROCEDURE VISIT (OUTPATIENT)
Dept: NEUROLOGY | Facility: CLINIC | Age: 60
End: 2020-07-14
Payer: COMMERCIAL

## 2020-07-14 VITALS
DIASTOLIC BLOOD PRESSURE: 86 MMHG | HEART RATE: 80 BPM | WEIGHT: 155 LBS | BODY MASS INDEX: 29.27 KG/M2 | HEIGHT: 61 IN | SYSTOLIC BLOOD PRESSURE: 130 MMHG

## 2020-07-14 DIAGNOSIS — G51.39 HEMIFACIAL SPASM: ICD-10-CM

## 2020-07-14 PROCEDURE — 64612 PR DEST,NERVE,FACIAL: ICD-10-PCS | Mod: LT,S$GLB,, | Performed by: PSYCHIATRY & NEUROLOGY

## 2020-07-14 PROCEDURE — 64612 DESTROY NERVE FACE MUSCLE: CPT | Mod: LT,S$GLB,, | Performed by: PSYCHIATRY & NEUROLOGY

## 2020-07-14 NOTE — PROCEDURES
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  Last injections in April. No eyedroop noted. Decreased pulling around eye and mouth by 40%. It's helped for 3 months.    BOTOX injections are medically necessary for this patient, due to effect on the patient's quality of life, causing physical discomfort, social embarrassment, and disruption of occupational and daily activities.  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  Anticholinergics (artane) and benzodiazepines (klonopin) have not been effective.       PHYSICAL EXAM (FOCUSED):    Vitals:    07/14/20 1549   BP: 130/86   Pulse: 80   Weight: 70.3 kg (155 lb)   Height: 5' 1" (1.549 m)     healthy, alert, no distress  Left hemifacial spasm that occurs multiple times/ minute during visit with complete left eye closure.  Mouth pulls to her left.    ASSESSMENT:  Left hemifacial spasm, appropriate for injections.    PLAN/PROCEDURE:  After explaining the most frequently reported adverse reactions in patients with primary blepharospasm and facial twitching following Botox injections were ptosis (20.8%), superficial punctate keratitis (6.3%) and eye dryness (6.3%). I answered all the patient's questions, and consent was obtained.     The goal of the injections will be to reduce spasms.  Using a 30 gauge injection needle and aseptic technique the following muscles were identified and injected with Botox.     BOTOX INJECTION PROCEDURE:   Dilution: 1ML 0.9% NORMAL SALINE  UNITS BOTOX    For hemifacial spasm: I injected a total of 12.5 units distributed around left eye, 2.5 units left cheek (risoris).       Patient supplied botulinum toxin?  no  Total amount of toxin used:  15.0 units  Total amount of toxin wasted: 75 units      Problem " List Items Addressed This Visit        Neuro    Hemifacial spasm    Overview     Left.  Receives botox [id number].  2019 MRI unremarkable.         Current Assessment & Plan     Botox injection for longstanding HFS. I changed her injection pattern to more proximal (towards eye) but smaller doses based on the visual inspection of muscle pulling. Stayed far from midline to avoid change for eye droop.                 Liat Boyd MD, MS  [facility]  Department of Neurology  Movement Disorders

## 2020-07-16 NOTE — ASSESSMENT & PLAN NOTE
Botox injection for longstanding HFS. I changed her injection pattern to more proximal (towards eye) but smaller doses based on the visual inspection of muscle pulling. Stayed far from midline to avoid change for eye droop.

## 2020-10-13 ENCOUNTER — PROCEDURE VISIT (OUTPATIENT)
Dept: NEUROLOGY | Facility: CLINIC | Age: 60
End: 2020-10-13
Payer: COMMERCIAL

## 2020-10-13 VITALS — HEIGHT: 61 IN | BODY MASS INDEX: 29.29 KG/M2

## 2020-10-13 DIAGNOSIS — G51.32 HEMIFACIAL SPASM OF LEFT SIDE OF FACE: ICD-10-CM

## 2020-10-13 PROCEDURE — 64612 PR DEST,NERVE,FACIAL: ICD-10-PCS | Mod: LT,S$GLB,, | Performed by: PSYCHIATRY & NEUROLOGY

## 2020-10-13 PROCEDURE — 64612 DESTROY NERVE FACE MUSCLE: CPT | Mod: LT,S$GLB,, | Performed by: PSYCHIATRY & NEUROLOGY

## 2020-10-13 NOTE — ASSESSMENT & PLAN NOTE
Botox injection for longstanding HFS. I changed her injection pattern at last visit  to more proximal (towards eye) but smaller doses based on the visual inspection of muscle pulling - this helped. Stayed far from midline to avoid change for eye droop.

## 2020-10-13 NOTE — PROCEDURES
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  Last injections helped.  No eyedroop noted. Decreased pulling around eye and mouth by 90%. It's helped for 3 months.    BOTOX injections are medically necessary for this patient, due to effect on the patient's quality of life, causing physical discomfort, social embarrassment, and disruption of occupational and daily activities.  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  Anticholinergics (artane) and benzodiazepines (klonopin) have not been effective.       PHYSICAL EXAM (FOCUSED):    Vitals:    10/13/20 1543   Height: 5' 1" (1.549 m)     healthy, alert, no distress  Left hemifacial spasm that occurs multiple times/ minute during visit with complete left eye closure.  Mouth pulls to her left.    ASSESSMENT:  Left hemifacial spasm, appropriate for injections.    PLAN/PROCEDURE:  After explaining the most frequently reported adverse reactions in patients with primary blepharospasm and facial twitching following Botox injections were ptosis (20.8%), superficial punctate keratitis (6.3%) and eye dryness (6.3%). I answered all the patient's questions, and consent was obtained.     The goal of the injections will be to reduce spasms.  Using a 30 gauge injection needle and aseptic technique the following muscles were identified and injected with Botox.     BOTOX INJECTION PROCEDURE:   Dilution: 1ML 0.9% NORMAL SALINE  UNITS BOTOX    For hemifacial spasm: I injected a total of 12.5 units distributed around left eye, 2.5 units left cheek (risoris).      Patients signed consent forms and we answered all questions about risks and benefits of botox injections. They agreed to the procedure.  ? L R "   ?orbicularis oculi superior medial ?2.5 ?   ?orbicularis oculi superior lateral ?2.5 ?   orbicularis oculi lateral 2.5    ?orbicularis oculi inferior medial ?2.5 ?   ?orbicularis oculi inferior lateral 2.5    rhizorus ?2.5 ?   The patient tolerated the procedure well and there were no immediate complications following the procedure.        Patient supplied botulinum toxin?  no  Total amount of toxin used:  15.0 units  Total amount of toxin wasted: 75 units      Problem List Items Addressed This Visit        Neuro    Hemifacial spasm    Overview     Left.  Receives botox [id number].  2019 MRI unremarkable.         Current Assessment & Plan     Botox injection for longstanding HFS. I changed her injection pattern at last visit  to more proximal (towards eye) but smaller doses based on the visual inspection of muscle pulling - this helped. Stayed far from midline to avoid change for eye droop.                 Liat Boyd MD, MS  [facility]  Department of Neurology  Movement Disorders

## 2021-01-11 DIAGNOSIS — G51.39 HEMIFACIAL SPASM, UNSPECIFIED LATERALITY: Primary | ICD-10-CM

## 2021-01-26 ENCOUNTER — PROCEDURE VISIT (OUTPATIENT)
Dept: NEUROLOGY | Facility: CLINIC | Age: 61
End: 2021-01-26
Payer: COMMERCIAL

## 2021-01-26 VITALS
SYSTOLIC BLOOD PRESSURE: 144 MMHG | HEART RATE: 90 BPM | DIASTOLIC BLOOD PRESSURE: 89 MMHG | BODY MASS INDEX: 29.29 KG/M2 | HEIGHT: 61 IN

## 2021-01-26 DIAGNOSIS — G51.32 HEMIFACIAL SPASM OF LEFT SIDE OF FACE: ICD-10-CM

## 2021-01-26 PROCEDURE — 64612 DESTROY NERVE FACE MUSCLE: CPT | Mod: LT,S$GLB,, | Performed by: PSYCHIATRY & NEUROLOGY

## 2021-01-26 PROCEDURE — 64612 PR DEST,NERVE,FACIAL: ICD-10-PCS | Mod: LT,S$GLB,, | Performed by: PSYCHIATRY & NEUROLOGY

## 2021-01-26 RX ORDER — LATANOPROST 50 UG/ML
SOLUTION/ DROPS OPHTHALMIC
COMMUNITY
Start: 2020-12-07

## 2021-04-29 ENCOUNTER — PROCEDURE VISIT (OUTPATIENT)
Dept: NEUROLOGY | Facility: CLINIC | Age: 61
End: 2021-04-29
Payer: COMMERCIAL

## 2021-04-29 VITALS
HEART RATE: 94 BPM | BODY MASS INDEX: 28.85 KG/M2 | DIASTOLIC BLOOD PRESSURE: 89 MMHG | SYSTOLIC BLOOD PRESSURE: 132 MMHG | WEIGHT: 152.69 LBS

## 2021-04-29 DIAGNOSIS — G51.32 HEMIFACIAL SPASM OF LEFT SIDE OF FACE: ICD-10-CM

## 2021-04-29 PROCEDURE — 64612 DESTROY NERVE FACE MUSCLE: CPT | Mod: LT,S$GLB,, | Performed by: PSYCHIATRY & NEUROLOGY

## 2021-04-29 PROCEDURE — 64612 PR DEST,NERVE,FACIAL: ICD-10-PCS | Mod: LT,S$GLB,, | Performed by: PSYCHIATRY & NEUROLOGY

## 2021-07-20 ENCOUNTER — PATIENT MESSAGE (OUTPATIENT)
Dept: NEUROLOGY | Facility: CLINIC | Age: 61
End: 2021-07-20

## 2021-08-12 ENCOUNTER — PROCEDURE VISIT (OUTPATIENT)
Dept: NEUROLOGY | Facility: CLINIC | Age: 61
End: 2021-08-12
Payer: COMMERCIAL

## 2021-08-12 VITALS
WEIGHT: 153 LBS | DIASTOLIC BLOOD PRESSURE: 84 MMHG | BODY MASS INDEX: 28.91 KG/M2 | SYSTOLIC BLOOD PRESSURE: 134 MMHG | HEART RATE: 76 BPM

## 2021-08-12 DIAGNOSIS — G51.32 HEMIFACIAL SPASM OF LEFT SIDE OF FACE: ICD-10-CM

## 2021-08-12 PROCEDURE — 64612 DESTROY NERVE FACE MUSCLE: CPT | Mod: LT,S$GLB,, | Performed by: PSYCHIATRY & NEUROLOGY

## 2021-08-12 PROCEDURE — 64612 PR DEST,NERVE,FACIAL: ICD-10-PCS | Mod: LT,S$GLB,, | Performed by: PSYCHIATRY & NEUROLOGY

## 2021-08-13 ENCOUNTER — TELEPHONE (OUTPATIENT)
Dept: NEUROLOGY | Facility: CLINIC | Age: 61
End: 2021-08-13

## 2021-09-03 ENCOUNTER — PATIENT MESSAGE (OUTPATIENT)
Dept: NEUROLOGY | Facility: CLINIC | Age: 61
End: 2021-09-03

## 2021-10-27 ENCOUNTER — OFFICE VISIT (OUTPATIENT)
Dept: URGENT CARE | Facility: CLINIC | Age: 61
End: 2021-10-27
Payer: COMMERCIAL

## 2021-10-27 VITALS
SYSTOLIC BLOOD PRESSURE: 116 MMHG | RESPIRATION RATE: 18 BRPM | HEIGHT: 61 IN | HEART RATE: 93 BPM | WEIGHT: 153 LBS | BODY MASS INDEX: 28.89 KG/M2 | DIASTOLIC BLOOD PRESSURE: 83 MMHG | OXYGEN SATURATION: 97 % | TEMPERATURE: 98 F

## 2021-10-27 DIAGNOSIS — R11.10 VOMITING, INTRACTABILITY OF VOMITING NOT SPECIFIED, PRESENCE OF NAUSEA NOT SPECIFIED, UNSPECIFIED VOMITING TYPE: Primary | ICD-10-CM

## 2021-10-27 DIAGNOSIS — A08.4 VIRAL GASTROENTERITIS: ICD-10-CM

## 2021-10-27 LAB
CTP QC/QA: YES
CTP QC/QA: YES
FLUAV AG NPH QL: NEGATIVE
FLUBV AG NPH QL: NEGATIVE
SARS-COV-2 RDRP RESP QL NAA+PROBE: NEGATIVE

## 2021-10-27 PROCEDURE — 3008F BODY MASS INDEX DOCD: CPT | Mod: CPTII,S$GLB,,

## 2021-10-27 PROCEDURE — 1159F MED LIST DOCD IN RCRD: CPT | Mod: CPTII,S$GLB,,

## 2021-10-27 PROCEDURE — 3079F PR MOST RECENT DIASTOLIC BLOOD PRESSURE 80-89 MM HG: ICD-10-PCS | Mod: CPTII,S$GLB,,

## 2021-10-27 PROCEDURE — U0002 COVID-19 LAB TEST NON-CDC: HCPCS | Mod: QW,S$GLB,,

## 2021-10-27 PROCEDURE — 3074F PR MOST RECENT SYSTOLIC BLOOD PRESSURE < 130 MM HG: ICD-10-PCS | Mod: CPTII,S$GLB,,

## 2021-10-27 PROCEDURE — 3074F SYST BP LT 130 MM HG: CPT | Mod: CPTII,S$GLB,,

## 2021-10-27 PROCEDURE — 87804 POCT INFLUENZA A/B: ICD-10-PCS | Mod: QW,S$GLB,,

## 2021-10-27 PROCEDURE — 99203 PR OFFICE/OUTPT VISIT, NEW, LEVL III, 30-44 MIN: ICD-10-PCS | Mod: 25,S$GLB,CS,

## 2021-10-27 PROCEDURE — 3079F DIAST BP 80-89 MM HG: CPT | Mod: CPTII,S$GLB,,

## 2021-10-27 PROCEDURE — 1159F PR MEDICATION LIST DOCUMENTED IN MEDICAL RECORD: ICD-10-PCS | Mod: CPTII,S$GLB,,

## 2021-10-27 PROCEDURE — 99203 OFFICE O/P NEW LOW 30 MIN: CPT | Mod: 25,S$GLB,CS,

## 2021-10-27 PROCEDURE — 3008F PR BODY MASS INDEX (BMI) DOCUMENTED: ICD-10-PCS | Mod: CPTII,S$GLB,,

## 2021-10-27 PROCEDURE — U0002: ICD-10-PCS | Mod: QW,S$GLB,,

## 2021-10-27 PROCEDURE — 87804 INFLUENZA ASSAY W/OPTIC: CPT | Mod: 59,QW,S$GLB,

## 2021-11-04 ENCOUNTER — PROCEDURE VISIT (OUTPATIENT)
Dept: NEUROLOGY | Facility: CLINIC | Age: 61
End: 2021-11-04
Payer: COMMERCIAL

## 2021-11-04 VITALS
DIASTOLIC BLOOD PRESSURE: 83 MMHG | WEIGHT: 153 LBS | HEART RATE: 79 BPM | SYSTOLIC BLOOD PRESSURE: 128 MMHG | BODY MASS INDEX: 28.91 KG/M2

## 2021-11-04 DIAGNOSIS — G51.32 HEMIFACIAL SPASM OF LEFT SIDE OF FACE: ICD-10-CM

## 2021-11-04 PROCEDURE — 64612 DESTROY NERVE FACE MUSCLE: CPT | Mod: LT,S$GLB,, | Performed by: PSYCHIATRY & NEUROLOGY

## 2021-11-04 PROCEDURE — 64612 PR DEST,NERVE,FACIAL: ICD-10-PCS | Mod: LT,S$GLB,, | Performed by: PSYCHIATRY & NEUROLOGY

## 2022-01-09 ENCOUNTER — OFFICE VISIT (OUTPATIENT)
Dept: URGENT CARE | Facility: CLINIC | Age: 62
End: 2022-01-09
Payer: COMMERCIAL

## 2022-01-09 VITALS
TEMPERATURE: 99 F | WEIGHT: 150 LBS | BODY MASS INDEX: 28.32 KG/M2 | DIASTOLIC BLOOD PRESSURE: 85 MMHG | OXYGEN SATURATION: 97 % | RESPIRATION RATE: 19 BRPM | HEIGHT: 61 IN | SYSTOLIC BLOOD PRESSURE: 143 MMHG | HEART RATE: 99 BPM

## 2022-01-09 DIAGNOSIS — R52 BODY ACHES: ICD-10-CM

## 2022-01-09 DIAGNOSIS — R05.9 COUGH: ICD-10-CM

## 2022-01-09 DIAGNOSIS — U07.1 COVID-19: Primary | ICD-10-CM

## 2022-01-09 LAB
CTP QC/QA: YES
CTP QC/QA: YES
POC MOLECULAR INFLUENZA A AGN: NEGATIVE
POC MOLECULAR INFLUENZA B AGN: NEGATIVE
SARS-COV-2 RDRP RESP QL NAA+PROBE: POSITIVE

## 2022-01-09 PROCEDURE — 3077F SYST BP >= 140 MM HG: CPT | Mod: CPTII,S$GLB,,

## 2022-01-09 PROCEDURE — 1160F RVW MEDS BY RX/DR IN RCRD: CPT | Mod: CPTII,S$GLB,,

## 2022-01-09 PROCEDURE — 87502 INFLUENZA DNA AMP PROBE: CPT | Mod: QW,S$GLB,,

## 2022-01-09 PROCEDURE — 3079F DIAST BP 80-89 MM HG: CPT | Mod: CPTII,S$GLB,,

## 2022-01-09 PROCEDURE — 3008F BODY MASS INDEX DOCD: CPT | Mod: CPTII,S$GLB,,

## 2022-01-09 PROCEDURE — 99213 PR OFFICE/OUTPT VISIT, EST, LEVL III, 20-29 MIN: ICD-10-PCS | Mod: S$GLB,,,

## 2022-01-09 PROCEDURE — 87502 POCT INFLUENZA A/B MOLECULAR: ICD-10-PCS | Mod: QW,S$GLB,,

## 2022-01-09 PROCEDURE — 3079F PR MOST RECENT DIASTOLIC BLOOD PRESSURE 80-89 MM HG: ICD-10-PCS | Mod: CPTII,S$GLB,,

## 2022-01-09 PROCEDURE — U0002: ICD-10-PCS | Mod: QW,CR,S$GLB,

## 2022-01-09 PROCEDURE — 1160F PR REVIEW ALL MEDS BY PRESCRIBER/CLIN PHARMACIST DOCUMENTED: ICD-10-PCS | Mod: CPTII,S$GLB,,

## 2022-01-09 PROCEDURE — 3077F PR MOST RECENT SYSTOLIC BLOOD PRESSURE >= 140 MM HG: ICD-10-PCS | Mod: CPTII,S$GLB,,

## 2022-01-09 PROCEDURE — 1159F MED LIST DOCD IN RCRD: CPT | Mod: CPTII,S$GLB,,

## 2022-01-09 PROCEDURE — 3008F PR BODY MASS INDEX (BMI) DOCUMENTED: ICD-10-PCS | Mod: CPTII,S$GLB,,

## 2022-01-09 PROCEDURE — 99213 OFFICE O/P EST LOW 20 MIN: CPT | Mod: S$GLB,,,

## 2022-01-09 PROCEDURE — 1159F PR MEDICATION LIST DOCUMENTED IN MEDICAL RECORD: ICD-10-PCS | Mod: CPTII,S$GLB,,

## 2022-01-09 PROCEDURE — U0002 COVID-19 LAB TEST NON-CDC: HCPCS | Mod: QW,CR,S$GLB,

## 2022-01-09 RX ORDER — ALBUTEROL SULFATE 90 UG/1
2 AEROSOL, METERED RESPIRATORY (INHALATION) EVERY 6 HOURS PRN
Qty: 20.1 G | Refills: 11 | Status: SHIPPED | OUTPATIENT
Start: 2022-01-09 | End: 2023-01-09

## 2022-01-09 RX ORDER — PROMETHAZINE HYDROCHLORIDE AND DEXTROMETHORPHAN HYDROBROMIDE 6.25; 15 MG/5ML; MG/5ML
5 SYRUP ORAL NIGHTLY PRN
Qty: 180 ML | Refills: 0 | Status: SHIPPED | OUTPATIENT
Start: 2022-01-09 | End: 2022-01-19

## 2022-01-09 RX ORDER — BENZONATATE 100 MG/1
200 CAPSULE ORAL 3 TIMES DAILY PRN
Qty: 60 CAPSULE | Refills: 0 | Status: SHIPPED | OUTPATIENT
Start: 2022-01-09 | End: 2022-01-19

## 2022-01-09 NOTE — LETTER
[address], [address] ? [place], [address] ? Phone [phone] ? Fax [phone]           Return to Work/School    Patient: Vonda Driscoll  YOB: 1960   Date: 01/09/2022      To Whom It May Concern:     Vonda Driscoll was in contact with/seen in my office on 01/09/2022. COVID-19 is present in our communities across the [facility]. Not all patients are eligible or appropriate to be tested. In this situation, your employee meets the following criteria:     Vonda Driscoll has met the criteria for COVID-19 testing and has a POSITIVE result. She can return to work once they are asymptomatic for 24 hours without the use of fever reducing medications AND at least five days from the start of symptoms (or from the first positive result if they have no symptoms).      If you have any questions or concerns, or if I can be of further assistance, please do not hesitate to contact me.     Sincerely,        Mackenzie Negron PA-C

## 2022-01-09 NOTE — PROGRESS NOTES
"Subjective:       Patient ID: Vonda Driscoll is a 61 y.o. female.    Vitals:  height is 5' 1" (1.549 m) and weight is 68 kg (150 lb). Her temporal temperature is 98.6 °F (37 °C). Her blood pressure is 143/85 (abnormal) and her pulse is 99. Her respiration is 19 and oxygen saturation is 97%.     Chief Complaint: URI    62 yo female presents to urgent care for evaluation. Pt presenting with a sore throat, body aches, a mostly productive cough, and fever (Tmax 100.8) for the last few days now. She has been taking Ibuprofen for her symptoms. Denies CP, SOB, weakness.     URI   This is a new problem. The current episode started in the past 7 days. Associated symptoms include coughing. Pertinent negatives include no abdominal pain, chest pain, congestion, ear pain, headaches, nausea, neck pain, sinus pain, sore throat, vomiting or wheezing.       Constitution: Positive for fever. Negative for chills, fatigue and unexpected weight change.   HENT: Negative for ear pain, ear discharge, congestion, sinus pain, sinus pressure, sore throat and trouble swallowing.    Neck: Negative for neck pain and neck stiffness.   Cardiovascular: Negative for chest pain.   Eyes: Negative for eye pain.   Respiratory: Positive for cough and sputum production. Negative for shortness of breath and wheezing.    Gastrointestinal: Negative for abdominal pain, nausea and vomiting.   Musculoskeletal: Positive for muscle ache.   Neurological: Negative for dizziness and headaches.       Objective:      Physical Exam   Constitutional: She is oriented to person, place, and time. She appears well-developed. She is cooperative.  Non-toxic appearance. She does not appear ill. No distress.      Comments:Sitting comfortably in exam room, well appearing. No acute distress.     HENT:   Head: Normocephalic and atraumatic.   Ears:   Right Ear: Hearing and external ear normal.   Left Ear: Hearing and external ear normal.   Nose: No epistaxis.   Eyes: Conjunctivae " and lids are normal. No scleral icterus.   Neck: Trachea normal and phonation normal. Neck supple. No edema present. No erythema present. No neck rigidity present.   Cardiovascular: Normal rate, regular rhythm, normal heart sounds and normal pulses.   Pulmonary/Chest: Effort normal and breath sounds normal. No respiratory distress. She has no decreased breath sounds. She has no rhonchi.    Comments: No evidence of respiratory distress noted, able to speak in complete sentences without pause; no wheezing, rales, or rhonchi appreciated; O2 sat 97% on RA      Abdominal: Normal appearance.   Musculoskeletal: Normal range of motion.         General: No deformity. Normal range of motion.   Neurological: She is alert and oriented to person, place, and time. She exhibits normal muscle tone. Coordination normal.   Skin: Skin is warm, dry, intact, not diaphoretic and not pale.   Psychiatric: Her speech is normal and behavior is normal. Judgment and thought content normal.   Nursing note and vitals reviewed.    Results for orders placed or performed in visit on 01/09/22   POCT Influenza A/B MOLECULAR   Result Value Ref Range    POC Molecular Influenza A Ag Negative Negative, Not Reported    POC Molecular Influenza B Ag Negative Negative, Not Reported     Acceptable Yes    POCT COVID-19 Rapid Screening   Result Value Ref Range    POC Rapid COVID Positive (A) Negative     Acceptable Yes      0        Assessment:       1. COVID-19    2. Body aches    3. Cough          Plan:         COVID-19  -     promethazine-dextromethorphan (PROMETHAZINE-DM) 6.25-15 mg/5 mL Syrp; Take 5 mLs by mouth nightly as needed (cough).  Dispense: 180 mL; Refill: 0  -     benzonatate (TESSALON) 100 MG capsule; Take 2 capsules (200 mg total) by mouth 3 (three) times daily as needed for Cough.  Dispense: 60 capsule; Refill: 0  -     albuterol (PROVENTIL HFA) 90 mcg/actuation inhaler; Inhale 2 puffs into the lungs every 6 (six)  hours as needed for Wheezing. Rescue  Dispense: 20.1 g; Refill: 11  -     (Magic mouthwash) 1:1:1 diphenhydramine(Benadryl) 12.5mg/5ml liq, aluminum & magnesium hydroxide-simethicone (Maalox), LIDOcaine viscous 2%; Swish and spit 10 mLs every 4 (four) hours as needed. for mouth sores  Dispense: 420 mL; Refill: 0    Body aches  -     POCT Influenza A/B MOLECULAR    Cough  -     POCT COVID-19 Rapid Screening    Reviewed Positive Covid test with patient who verbalized understanding.  Patient informed that her symptoms are indicative of a viral illness which is treated symptomatically.  We discussed over the counter treatment for this condition. Patient educational handouts also included in discharge paperwork and given to patient who verbalized understanding and agrees with plan of care.  She denies any further questions or concerns at this time.  Patient exits exam room in no acute distress.    Discussed results with patient and proper quarantine based on CDC guidelines.   Discussed use of OTC medications for symptom control as this is a viral disease.   All ER precautions covered including but not limited to shortness of breath, intractable fever, or chest pain.  Discussed RTC if symptoms worsen, change, or persist.     Patient verbalized understanding and agreed with the plan.     Mackenzie Negron PA-C         Patient Instructions   You have tested positive for COVID-19 today.      ISOLATION  If you tested positive and do not have symptoms, you must isolate for 5 days starting on the day of the positive test. I    If you tested positive and have symptoms, you must isolate for 5 days starting on the day of the first symptoms,  not the day of the positive test.     This is the most important part, both the CDC and the [facility] emphasize that you do not test out of isolation.     After 5 days, if your symptoms have improved and you have not had fever on day 5, you can return to the community on day 6- NO TESTING REQUIRED!       In fact, we do not retest if you were positive in the last 90 days.    After your 5 days of isolation are completed, the CDC recommends strict mask use for the first 5 days that you come out of isolation.     Return to Urgent Care or go to ER if symptoms worsen or fail to improve.  Follow up with PCP as recommended for further management.     Your symptoms should begin to improve by Day 5 of the infection-- if symptoms worsen, you develop a new fever, shortness of breath, worsening shortness of breath with activity, chest pain, worsening cough, you must return to Urgent Care or go to the ER.    PLEASE READ YOUR DISCHARGE INSTRUCTIONS ENTIRELY AS IT CONTAINS IMPORTANT INFORMATION.      Please drink plenty of fluids.    Please get plenty of rest.    Please return here or go to the Emergency Department for any concerns or worsening of condition.      Tylenol or ibuprofen can also be used as directed for pain and fever unless you have an allergy to them or medical condition such as stomach ulcers, kidney or liver disease or blood thinners etc for which you should not be taking these type of medications.     For your allergy symptoms and/or runny nose, sinus/ear pressure, congestion:     Please take an over the counter antihistamine medication (allegra/Claritin/Zyrtec) of your choice as directed and Sudafed (the one behind the counter at the pharmacy) or Mucinex-D (if it gives you funny heartbeats you can switch to regular mucinex). If you do have Hypertension or palpitations, it is safe to take Coricidin HBP for relief of sinus symptoms.    Use over the counter flonase or nasocort: one spray each nostril twice daily OR two sprays each nostril once daily until nares dry out, unless you have Glaucoma.   If you find this dries your nose out or your nose bleeds, try using over the counter nasal saline a few minutes prior to using the flonase to moisten the lining of your nose and throughout the day as needed.     You  can try breathe right strips at night to help you breathe.  A cool mist humidifier in bedroom may help with cough and relieve stuffy nose.     Sinus rinses DO NOT USE TAP WATER, if you must, water must be a rolling boil for 1 minute, let it cool, then use.  May use distilled water, or over the counter nasal saline rinses.  Vics vapor rub in shower to help open nasal passages.  May use nasal gel to keep passages moisturized.  May use Nasal saline sprays during the day for added relief of congestion.   For those who go to the gym, please do not use the sauna or steam room now to clear sinuses.      Sore throat recommendations: Warm fluids, warm salt water gargles, throat lozenges, tea, honey, soup, rest, hydration.      Cough     Rest and fluids are important  Can use honey with jazlyn to soothe your throat    Robitussin or Delsyum for cough suppressant for dry cough.    Mucinex DM or products containing Guaifenesin or Dextromethorphan for expectorant (wet cough).    Take prescription cough meds (pills) as prescribed; take prescription cough syrup at night as needed for cough.  Do not take both the prescribed cough pills and syrup at the same time or within 6 hours of each other.  Do not take the cough syrup with any other sedative medication as it can can cause drowsiness. Do not operate any heavy machinery, drink or drive while taking the cough syrup.     Please follow up with your primary care doctor or specialist in the next 48-72hrs as needed and if no improvement    If you  smoke, please stop smoking.      Please return or see your primary care doctor if you develop new or worsening symptoms.     Please arrange follow up with your primary medical clinic as soon as possible. You must understand that you've received an Urgent Care treatment only and that you may be released before all of your medical problems are known or treated. You, the patient, will arrange for follow up as instructed. If your symptoms worsen  or fail to improve you should go to the Emergency Room.    Patient Education       COVID-19 Discharge Instructions   About this topic   Coronavirus disease 2019 is also known as COVID-19. It is a viral illness that infects the lungs. It is caused by a virus called SARS-associated coronavirus (SARS-CoV-2).  The signs of COVID-19 most often start a few days after you have been infected. In some people, it takes longer to show signs. Others never show signs of the infection. You may have a cough, fever, shaking chills and it may be hard to breathe. You may be very tired, have muscle aches, a headache or sore throat. Some people have an upset stomach or loose stools. Others lose their sense of smell or taste. You may not have these signs all the time and they may come and go while you are sick.  The virus spreads easily through droplets when you talk, sneeze, or cough. You can pass the virus to others when you are talking close together, singing, hugging, sharing food, or shaking hands. Doctors believe the germs also survive on surfaces like tables, door handles, and telephones. However, this is not a common way that COVID-19 spreads. Doctors believe you can also spread the infection even if you dont have any symptoms, but they do not know how that happens. This is why getting vaccinated is one of the best ways to keep you healthy and slow the spread of the virus.  Some people have a mild case of COVID-19 and are able to stay at home and away from others until they feel better. Others may need to be in the hospital if they are very sick. Some people with COVID-19 can have some symptoms for weeks or months. People with COVID-19 must isolate themselves. You can start to be around others when your doctor says it is safe to do so.       What care is needed at home?   · Ask your doctor what you need to do when you go home. Make sure you ask questions if you do not understand what the doctor says.  · Drink lots of water,  juice, or broth to replace fluids lost from a fever.  · You may use cool mist humidifiers to help ease congestion and coughing.  · Use 2 to 3 pillows to prop yourself up when you lie down to make it easier to breathe and sleep.  · Do not smoke and do not drink beer, wine, and mixed drinks (alcohol).  · To lower the chance of passing the infection to others, get a COVID-19 vaccine after your infection has resolved.  · If you have not been fully vaccinated:  ? Wear a mask over your mouth and nose if you are around others who are not sick. Cloth masks work best if they have more than one layer of fabric.  ? Wash your hands often.  ? Stay home in a separate room, if possible, away from others. Only go out to get medical care.  ? Use a separate bathroom if possible.  ? Do not make food for others.  What follow-up care is needed?   · Your doctor may ask you to make visits to the office to check on your progress. Be sure to keep these visits. Make sure you wear a mask at these visits.  · If you can, tell the staff you have COVID-19 ahead of time so they can take extra care to stop the disease from spreading.  · It may take a few weeks before your health returns to normal.  What drugs may be needed?   The doctor may order drugs to:  · Help with breathing  · Help with fever  · Help with swelling in your airways and lungs  · Control coughing  · Ease a sore throat  · Help a runny or stuffy nose  Will physical activity be limited?   You may have to limit your physical activity. Talk to your doctor about the right amount of activity for you. If you have been very sick with COVID-19, it can take some time to get your strength back.  Will there be any other care needed?   Doctors do not know how long you can pass the virus on to others after you are sick. This is why it is important to stay in a separate room, if possible, when you are sick. For now, doctors are giving general guidelines for you to follow after you have been sick.  Before you go around other people, you should:  · Be fever free for 24 hours without taking any drugs to lower the fever  · Have no symptoms of cough or shortness of breath  · Wait at least 10 days after first having symptoms or your first positive test, and you need to be symptom free as above. Some experts suggest waiting 20 days if you have had a more severe infection.  Talk with your doctor about getting a COVID-19 vaccine.  What problems could happen?   · Fluid loss. This is dehydration.  · Short-term or long-term lung damage  · Heart problems  · Death  When do I need to call the doctor?   · You are having so much trouble breathing that you can only say one or two words at a time.  · You need to sit upright at all times to be able to breathe and/or cannot lie down.  · You are very confused or cannot stay awake.  · Your lips or skin start to turn blue or grey.  · You think you might be having a medical emergency. Some examples of medical emergencies are:  ? Severe chest pain.  ? Not able to speak or move normally.  · You have trouble breathing when talking or sitting still.  · You have new shortness of breath.  · You become weak or dizzy.  · You have very dark urine or do not pass urine for more than 8 hours.  · You have new or worsening COVID-19 symptoms like:  ? Fever  ? Cough  ? Feeling very tired  ? Shaking chills  ? Headache  ? Trouble swallowing  ? Throwing up  ? Loose stools  ? Reddish purple spots on your fingers or toes  Teach Back: Helping You Understand   The Teach Back Method helps you understand the information we are giving you. After you talk with the staff, tell them in your own words what you learned. This helps to make sure the staff has described each thing clearly. It also helps to explain things that may have been confusing. Before going home, make sure you can do these:  · I can tell you about my condition.  · I can tell you what may help ease my breathing.  · I can tell you what I can do  to help avoid passing the infection to others.  · I can tell you what I will do if I have trouble breathing; feel sleepy or confused; or my fingertips, fingernails, skin, or lips are blue.  Where can I learn more?   Centers for Disease Control and Prevention  https://www.cdc.gov/coronavirus/2019-ncov/about/index.html   Centers for Disease Control and Prevention  https://www.cdc.gov/coronavirus/2019-ncov/hcp/disposition-in-home-patients.html   World Health Organization  https://www.who.int/news-room/q-a-detail/h-v-osrkmzdjuvllw   Last Reviewed Date   2021-10-05  Consumer Information Use and Disclaimer   This information is not specific medical advice and does not replace information you receive from your health care provider. This is only a brief summary of general information. It does NOT include all information about conditions, illnesses, injuries, tests, procedures, treatments, therapies, discharge instructions or life-style choices that may apply to you. You must talk with your health care provider for complete information about your health and treatment options. This information should not be used to decide whether or not to accept your health care providers advice, instructions or recommendations. Only your health care provider has the knowledge and training to provide advice that is right for you.  Copyright   Copyright © 2021 [employer] and its affiliates and/or licensors. All rights reserved.

## 2022-01-09 NOTE — PATIENT INSTRUCTIONS
You have tested positive for COVID-19 today.      ISOLATION  If you tested positive and do not have symptoms, you must isolate for 5 days starting on the day of the positive test. I    If you tested positive and have symptoms, you must isolate for 5 days starting on the day of the first symptoms,  not the day of the positive test.     This is the most important part, both the CDC and the [facility] emphasize that you do not test out of isolation.     After 5 days, if your symptoms have improved and you have not had fever on day 5, you can return to the community on day 6- NO TESTING REQUIRED!      In fact, we do not retest if you were positive in the last 90 days.    After your 5 days of isolation are completed, the CDC recommends strict mask use for the first 5 days that you come out of isolation.     Return to Urgent Care or go to ER if symptoms worsen or fail to improve.  Follow up with PCP as recommended for further management.     Your symptoms should begin to improve by Day 5 of the infection-- if symptoms worsen, you develop a new fever, shortness of breath, worsening shortness of breath with activity, chest pain, worsening cough, you must return to Urgent Care or go to the ER.    PLEASE READ YOUR DISCHARGE INSTRUCTIONS ENTIRELY AS IT CONTAINS IMPORTANT INFORMATION.      Please drink plenty of fluids.    Please get plenty of rest.    Please return here or go to the Emergency Department for any concerns or worsening of condition.      Tylenol or ibuprofen can also be used as directed for pain and fever unless you have an allergy to them or medical condition such as stomach ulcers, kidney or liver disease or blood thinners etc for which you should not be taking these type of medications.     For your allergy symptoms and/or runny nose, sinus/ear pressure, congestion:     Please take an over the counter antihistamine medication (allegra/Claritin/Zyrtec) of your choice as directed and Sudafed (the one behind the  counter at the pharmacy) or Mucinex-D (if it gives you funny heartbeats you can switch to regular mucinex). If you do have Hypertension or palpitations, it is safe to take Coricidin HBP for relief of sinus symptoms.    Use over the counter flonase or nasocort: one spray each nostril twice daily OR two sprays each nostril once daily until nares dry out, unless you have Glaucoma.   If you find this dries your nose out or your nose bleeds, try using over the counter nasal saline a few minutes prior to using the flonase to moisten the lining of your nose and throughout the day as needed.     You can try breathe right strips at night to help you breathe.  A cool mist humidifier in bedroom may help with cough and relieve stuffy nose.     Sinus rinses DO NOT USE TAP WATER, if you must, water must be a rolling boil for 1 minute, let it cool, then use.  May use distilled water, or over the counter nasal saline rinses.  Vics vapor rub in shower to help open nasal passages.  May use nasal gel to keep passages moisturized.  May use Nasal saline sprays during the day for added relief of congestion.   For those who go to the gym, please do not use the sauna or steam room now to clear sinuses.      Sore throat recommendations: Warm fluids, warm salt water gargles, throat lozenges, tea, honey, soup, rest, hydration.      Cough     Rest and fluids are important  Can use honey with jazlyn to soothe your throat    Robitussin or Delsyum for cough suppressant for dry cough.    Mucinex DM or products containing Guaifenesin or Dextromethorphan for expectorant (wet cough).    Take prescription cough meds (pills) as prescribed; take prescription cough syrup at night as needed for cough.  Do not take both the prescribed cough pills and syrup at the same time or within 6 hours of each other.  Do not take the cough syrup with any other sedative medication as it can can cause drowsiness. Do not operate any heavy machinery, drink or drive while  taking the cough syrup.     Please follow up with your primary care doctor or specialist in the next 48-72hrs as needed and if no improvement    If you  smoke, please stop smoking.      Please return or see your primary care doctor if you develop new or worsening symptoms.     Please arrange follow up with your primary medical clinic as soon as possible. You must understand that you've received an Urgent Care treatment only and that you may be released before all of your medical problems are known or treated. You, the patient, will arrange for follow up as instructed. If your symptoms worsen or fail to improve you should go to the Emergency Room.    Patient Education       COVID-19 Discharge Instructions   About this topic   Coronavirus disease 2019 is also known as COVID-19. It is a viral illness that infects the lungs. It is caused by a virus called SARS-associated coronavirus (SARS-CoV-2).  The signs of COVID-19 most often start a few days after you have been infected. In some people, it takes longer to show signs. Others never show signs of the infection. You may have a cough, fever, shaking chills and it may be hard to breathe. You may be very tired, have muscle aches, a headache or sore throat. Some people have an upset stomach or loose stools. Others lose their sense of smell or taste. You may not have these signs all the time and they may come and go while you are sick.  The virus spreads easily through droplets when you talk, sneeze, or cough. You can pass the virus to others when you are talking close together, singing, hugging, sharing food, or shaking hands. Doctors believe the germs also survive on surfaces like tables, door handles, and telephones. However, this is not a common way that COVID-19 spreads. Doctors believe you can also spread the infection even if you dont have any symptoms, but they do not know how that happens. This is why getting vaccinated is one of the best ways to keep you healthy  and slow the spread of the virus.  Some people have a mild case of COVID-19 and are able to stay at home and away from others until they feel better. Others may need to be in the hospital if they are very sick. Some people with COVID-19 can have some symptoms for weeks or months. People with COVID-19 must isolate themselves. You can start to be around others when your doctor says it is safe to do so.       What care is needed at home?   · Ask your doctor what you need to do when you go home. Make sure you ask questions if you do not understand what the doctor says.  · Drink lots of water, juice, or broth to replace fluids lost from a fever.  · You may use cool mist humidifiers to help ease congestion and coughing.  · Use 2 to 3 pillows to prop yourself up when you lie down to make it easier to breathe and sleep.  · Do not smoke and do not drink beer, wine, and mixed drinks (alcohol).  · To lower the chance of passing the infection to others, get a COVID-19 vaccine after your infection has resolved.  · If you have not been fully vaccinated:  ? Wear a mask over your mouth and nose if you are around others who are not sick. Cloth masks work best if they have more than one layer of fabric.  ? Wash your hands often.  ? Stay home in a separate room, if possible, away from others. Only go out to get medical care.  ? Use a separate bathroom if possible.  ? Do not make food for others.  What follow-up care is needed?   · Your doctor may ask you to make visits to the office to check on your progress. Be sure to keep these visits. Make sure you wear a mask at these visits.  · If you can, tell the staff you have COVID-19 ahead of time so they can take extra care to stop the disease from spreading.  · It may take a few weeks before your health returns to normal.  What drugs may be needed?   The doctor may order drugs to:  · Help with breathing  · Help with fever  · Help with swelling in your airways and lungs  · Control  coughing  · Ease a sore throat  · Help a runny or stuffy nose  Will physical activity be limited?   You may have to limit your physical activity. Talk to your doctor about the right amount of activity for you. If you have been very sick with COVID-19, it can take some time to get your strength back.  Will there be any other care needed?   Doctors do not know how long you can pass the virus on to others after you are sick. This is why it is important to stay in a separate room, if possible, when you are sick. For now, doctors are giving general guidelines for you to follow after you have been sick. Before you go around other people, you should:  · Be fever free for 24 hours without taking any drugs to lower the fever  · Have no symptoms of cough or shortness of breath  · Wait at least 10 days after first having symptoms or your first positive test, and you need to be symptom free as above. Some experts suggest waiting 20 days if you have had a more severe infection.  Talk with your doctor about getting a COVID-19 vaccine.  What problems could happen?   · Fluid loss. This is dehydration.  · Short-term or long-term lung damage  · Heart problems  · Death  When do I need to call the doctor?   · You are having so much trouble breathing that you can only say one or two words at a time.  · You need to sit upright at all times to be able to breathe and/or cannot lie down.  · You are very confused or cannot stay awake.  · Your lips or skin start to turn blue or grey.  · You think you might be having a medical emergency. Some examples of medical emergencies are:  ? Severe chest pain.  ? Not able to speak or move normally.  · You have trouble breathing when talking or sitting still.  · You have new shortness of breath.  · You become weak or dizzy.  · You have very dark urine or do not pass urine for more than 8 hours.  · You have new or worsening COVID-19 symptoms like:  ? Fever  ? Cough  ? Feeling very tired  ? Shaking  chills  ? Headache  ? Trouble swallowing  ? Throwing up  ? Loose stools  ? Reddish purple spots on your fingers or toes  Teach Back: Helping You Understand   The Teach Back Method helps you understand the information we are giving you. After you talk with the staff, tell them in your own words what you learned. This helps to make sure the staff has described each thing clearly. It also helps to explain things that may have been confusing. Before going home, make sure you can do these:  · I can tell you about my condition.  · I can tell you what may help ease my breathing.  · I can tell you what I can do to help avoid passing the infection to others.  · I can tell you what I will do if I have trouble breathing; feel sleepy or confused; or my fingertips, fingernails, skin, or lips are blue.  Where can I learn more?   Centers for Disease Control and Prevention  https://www.cdc.gov/coronavirus/2019-ncov/about/index.html   Centers for Disease Control and Prevention  https://www.cdc.gov/coronavirus/2019-ncov/hcp/disposition-in-home-patients.html   World Health Organization  https://www.who.int/news-room/q-a-detail/m-b-ozbapavreccum   Last Reviewed Date   2021-10-05  Consumer Information Use and Disclaimer   This information is not specific medical advice and does not replace information you receive from your health care provider. This is only a brief summary of general information. It does NOT include all information about conditions, illnesses, injuries, tests, procedures, treatments, therapies, discharge instructions or life-style choices that may apply to you. You must talk with your health care provider for complete information about your health and treatment options. This information should not be used to decide whether or not to accept your health care providers advice, instructions or recommendations. Only your health care provider has the knowledge and training to provide advice that is right for you.  Copyright    Copyright © 2021 [employer] and its affiliates and/or licensors. All rights reserved.

## 2022-01-10 DIAGNOSIS — G51.39 HEMIFACIAL SPASM, UNSPECIFIED LATERALITY: Primary | ICD-10-CM

## 2022-01-28 ENCOUNTER — PATIENT MESSAGE (OUTPATIENT)
Dept: NEUROLOGY | Facility: CLINIC | Age: 62
End: 2022-01-28
Payer: COMMERCIAL

## 2022-02-10 ENCOUNTER — PROCEDURE VISIT (OUTPATIENT)
Dept: NEUROLOGY | Facility: CLINIC | Age: 62
End: 2022-02-10
Payer: COMMERCIAL

## 2022-02-10 VITALS
HEART RATE: 85 BPM | SYSTOLIC BLOOD PRESSURE: 129 MMHG | WEIGHT: 149.94 LBS | DIASTOLIC BLOOD PRESSURE: 83 MMHG | BODY MASS INDEX: 28.33 KG/M2

## 2022-02-10 DIAGNOSIS — G51.32 HEMIFACIAL SPASM OF LEFT SIDE OF FACE: ICD-10-CM

## 2022-02-10 PROCEDURE — 64612 DESTROY NERVE FACE MUSCLE: CPT | Mod: LT,S$GLB,, | Performed by: PSYCHIATRY & NEUROLOGY

## 2022-02-10 PROCEDURE — 64612 PR DEST,NERVE,FACIAL: ICD-10-PCS | Mod: LT,S$GLB,, | Performed by: PSYCHIATRY & NEUROLOGY

## 2022-02-10 NOTE — PROCEDURES
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  Last injections helped.  No eyedroop noted. Some mouth droop. Decreased pulling around eye and mouth by 90%. It's helped for 3 months.    BOTOX injections are medically necessary for this patient, due to effect on the patient's quality of life, causing physical discomfort, social embarrassment, and disruption of occupational and daily activities.  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  Anticholinergics (artane) and benzodiazepines (klonopin) have not been effective.       PHYSICAL EXAM (FOCUSED):    Vitals:    02/10/22 1509   BP: 129/83   Pulse: 85   Weight: 68 kg (149 lb 14.6 oz)     healthy, alert, no distress  Left hemifacial spasm that occurs multiple times/ minute during visit with complete left eye closure.  Mouth pulls to her left.    ASSESSMENT:  Left hemifacial spasm, appropriate for injections.    PLAN/PROCEDURE:  After explaining the most frequently reported adverse reactions in patients with primary blepharospasm and facial twitching following Botox injections were ptosis (20.8%), superficial punctate keratitis (6.3%) and eye dryness (6.3%). I answered all the patient's questions, and consent was obtained.     The goal of the injections will be to reduce spasms.  Using a 30 gauge injection needle and aseptic technique the following muscles were identified and injected with Botox.     BOTOX INJECTION PROCEDURE:   Dilution: 1ML 0.9% NORMAL SALINE  UNITS BOTOX      Patients signed consent forms and we answered all questions about risks and benefits of botox injections. They agreed to the procedure.  ? L R   ?orbicularis oculi superior medial ?2.5 ?   ?orbicularis oculi superior  lateral ?2.5 ?   orbicularis oculi lateral 2.5    ?orbicularis oculi inferior medial ?2.5 ?   ?orbicularis oculi inferior lateral 2.5    Orbicularis oris inferior     2.5    rhizorius 2.5+    mentalis 2.5+    Orbicularis oris superior ?2.5 ?   The patient tolerated the procedure well and there were no immediate complications following the procedure.        Patient supplied botulinum toxin?  no  Total amount of toxin used:  22.5 units  Total amount of toxin wasted: 77.5 units      Problem List Items Addressed This Visit        Neuro    Hemifacial spasm    Overview     Left.  Receives botox [id number].  2019 MRI unremarkable.         Current Assessment & Plan     Doing well s/p injections  No overweakening               Liat Boyd MD, MS  [facility]  Department of Neurology  Movement Disorders

## 2022-02-21 ENCOUNTER — PATIENT MESSAGE (OUTPATIENT)
Dept: NEUROLOGY | Facility: CLINIC | Age: 62
End: 2022-02-21
Payer: COMMERCIAL

## 2022-05-02 ENCOUNTER — PATIENT MESSAGE (OUTPATIENT)
Dept: NEUROLOGY | Facility: CLINIC | Age: 62
End: 2022-05-02
Payer: COMMERCIAL

## 2022-05-03 ENCOUNTER — PROCEDURE VISIT (OUTPATIENT)
Dept: NEUROLOGY | Facility: CLINIC | Age: 62
End: 2022-05-03
Payer: COMMERCIAL

## 2022-05-03 VITALS — DIASTOLIC BLOOD PRESSURE: 90 MMHG | HEART RATE: 83 BPM | SYSTOLIC BLOOD PRESSURE: 147 MMHG

## 2022-05-03 DIAGNOSIS — G51.32 HEMIFACIAL SPASM OF LEFT SIDE OF FACE: ICD-10-CM

## 2022-05-03 PROCEDURE — 64612 PR DEST,NERVE,FACIAL: ICD-10-PCS | Mod: LT,S$GLB,, | Performed by: PSYCHIATRY & NEUROLOGY

## 2022-05-03 PROCEDURE — 64612 DESTROY NERVE FACE MUSCLE: CPT | Mod: LT,S$GLB,, | Performed by: PSYCHIATRY & NEUROLOGY

## 2022-05-03 NOTE — PROCEDURES
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  Last injections helped.  No eyedroop noted. Some mouth droop. Decreased pulling around eye and mouth by 90%. It's helped for 3 months.    BOTOX injections are medically necessary for this patient, due to effect on the patient's quality of life, causing physical discomfort, social embarrassment, and disruption of occupational and daily activities.  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  Anticholinergics (artane) and benzodiazepines (klonopin) have not been effective.       PHYSICAL EXAM (FOCUSED):    Vitals:    05/03/22 1510   BP: (!) 147/90   Pulse: 83     healthy, alert, no distress  Left hemifacial spasm that occurs multiple times/ minute during visit with complete left eye closure.  Mouth pulls to her left.    ASSESSMENT:  Left hemifacial spasm, appropriate for injections.    PLAN/PROCEDURE:  After explaining the most frequently reported adverse reactions in patients with primary blepharospasm and facial twitching following Botox injections were ptosis (20.8%), superficial punctate keratitis (6.3%) and eye dryness (6.3%). I answered all the patient's questions, and consent was obtained.     The goal of the injections will be to reduce spasms.  Using a 30 gauge injection needle and aseptic technique the following muscles were identified and injected with Botox.     BOTOX INJECTION PROCEDURE:   Dilution: 1ML 0.9% NORMAL SALINE  UNITS BOTOX      Patients signed consent forms and we answered all questions about risks and benefits of botox injections. They agreed to the procedure.  ? L R   ?orbicularis oculi superior medial ?2.5 ?   ?orbicularis oculi superior lateral ?2.5 ?   orbicularis  oculi lateral 2.5    ?orbicularis oculi inferior medial ?2.5 ?   ?orbicularis oculi inferior lateral 2.5    Orbicularis oris inferior     2.5    rhizorius 2.5+2.5    mentalis 2.5+    Orbicularis oris superior ?2.5 ?   The patient tolerated the procedure well and there were no immediate complications following the procedure.        Patient supplied botulinum toxin?  no  Total amount of toxin used:  22.5 units  Total amount of toxin wasted: 77.5 units      Problem List Items Addressed This Visit    None         Liat Boyd MD, MS  [facility]  Department of Neurology  Movement Disorders

## 2022-05-03 NOTE — PROCEDURES
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  Last injections helped.  No eyedroop noted. Some mouth droop. Decreased pulling around eye and mouth by 90%. It's helped for 3 months.  Required more for L cheek pulling    BOTOX injections are medically necessary for this patient, due to effect on the patient's quality of life, causing physical discomfort, social embarrassment, and disruption of occupational and daily activities.  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  Anticholinergics (artane) and benzodiazepines (klonopin) have not been effective.       PHYSICAL EXAM (FOCUSED):    Vitals:    05/03/22 1510   BP: (!) 147/90   Pulse: 83     healthy, alert, no distress  Left hemifacial spasm that occurs multiple times/ minute during visit with complete left eye closure.  Mouth pulls to her left.    ASSESSMENT:  Left hemifacial spasm, appropriate for injections.    PLAN/PROCEDURE:  After explaining the most frequently reported adverse reactions in patients with primary blepharospasm and facial twitching following Botox injections were ptosis (20.8%), superficial punctate keratitis (6.3%) and eye dryness (6.3%). I answered all the patient's questions, and consent was obtained.     The goal of the injections will be to reduce spasms.  Using a 30 gauge injection needle and aseptic technique the following muscles were identified and injected with Botox.     BOTOX INJECTION PROCEDURE:   Dilution: 1ML 0.9% NORMAL SALINE  UNITS BOTOX      Patients signed consent forms and we answered all questions about risks and benefits of botox injections. They agreed to the procedure.  ? L R   ?orbicularis oculi superior medial ?2.5 ?   ?orbicularis oculi  superior lateral ?2.5 ?   orbicularis oculi lateral 2.5    ?orbicularis oculi inferior medial ?2.5 ?   ?orbicularis oculi inferior lateral 2.5    Orbicularis oris inferior     2.5    rhizorius 2.5->5    mentalis 2.5    Orbicularis oris superior ?2.5 ?   The patient tolerated the procedure well and there were no immediate complications following the procedure.        Patient supplied botulinum toxin?  no  Total amount of toxin used:  25 units  Total amount of toxin wasted: 75 units      Problem List Items Addressed This Visit        Neuro    Hemifacial spasm    Overview     Left.  Receives botox [id number].  2019 MRI unremarkable.           Current Assessment & Plan     Doing well s/p injections  Added more to zygomaticus  No overweakening                 Liat Boyd MD, MS  [facility]  Department of Neurology  Movement Disorders

## 2022-06-29 ENCOUNTER — PATIENT MESSAGE (OUTPATIENT)
Dept: NEUROLOGY | Facility: CLINIC | Age: 62
End: 2022-06-29
Payer: COMMERCIAL

## 2022-06-30 ENCOUNTER — PATIENT MESSAGE (OUTPATIENT)
Dept: NEUROLOGY | Facility: CLINIC | Age: 62
End: 2022-06-30
Payer: COMMERCIAL

## 2022-09-02 ENCOUNTER — PROCEDURE VISIT (OUTPATIENT)
Dept: NEUROLOGY | Facility: CLINIC | Age: 62
End: 2022-09-02
Payer: COMMERCIAL

## 2022-09-02 VITALS
DIASTOLIC BLOOD PRESSURE: 86 MMHG | WEIGHT: 158.75 LBS | SYSTOLIC BLOOD PRESSURE: 128 MMHG | HEART RATE: 87 BPM | BODY MASS INDEX: 29.99 KG/M2

## 2022-09-02 DIAGNOSIS — G51.32 HEMIFACIAL SPASM OF LEFT SIDE OF FACE: ICD-10-CM

## 2022-09-02 PROCEDURE — 64612 PR DEST,NERVE,FACIAL: ICD-10-PCS | Mod: LT,S$GLB,, | Performed by: PSYCHIATRY & NEUROLOGY

## 2022-09-02 PROCEDURE — 64612 DESTROY NERVE FACE MUSCLE: CPT | Mod: LT,S$GLB,, | Performed by: PSYCHIATRY & NEUROLOGY

## 2022-09-02 NOTE — PROCEDURES
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  Last injections helped.  No eyedroop noted. Some mouth droop. Decreased pulling around eye and mouth by 90%. It's helped for 3 months.  Required more for L cheek pulling    BOTOX injections are medically necessary for this patient, due to effect on the patient's quality of life, causing physical discomfort, social embarrassment, and disruption of occupational and daily activities.  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  Anticholinergics (artane) and benzodiazepines (klonopin) have not been effective.       PHYSICAL EXAM (FOCUSED):    Vitals:    09/02/22 0925   BP: 128/86   Pulse: 87   Weight: 72 kg (158 lb 11.7 oz)     healthy, alert, no distress  Left hemifacial spasm that occurs multiple times/ minute during visit with complete left eye closure.  Mouth pulls to her left.    ASSESSMENT:  Left hemifacial spasm, appropriate for injections.    PLAN/PROCEDURE:  After explaining the most frequently reported adverse reactions in patients with primary blepharospasm and facial twitching following Botox injections were ptosis (20.8%), superficial punctate keratitis (6.3%) and eye dryness (6.3%). I answered all the patient's questions, and consent was obtained.     The goal of the injections will be to reduce spasms.  Using a 30 gauge injection needle and aseptic technique the following muscles were identified and injected with Botox.     BOTOX INJECTION PROCEDURE:   Dilution: 1ML 0.9% NORMAL SALINE  UNITS BOTOX      Patients signed consent forms and we answered all questions about risks and benefits of botox injections. They agreed to the procedure.  ? L R   ?orbicularis  oculi superior medial ?2.5 ?   ?orbicularis oculi superior lateral ?2.5 ?   orbicularis oculi lateral 2.5    ?orbicularis oculi inferior medial ?2.5 ?   ?orbicularis oculi inferior lateral 2.5    Orbicularis oris inferior     2.5    rhizorius 5    nasalis +2.5    mentalis 2.5+2.5    Orbicularis oris superior ?2.5 ?   The patient tolerated the procedure well and there were no immediate complications following the procedure.        Patient supplied botulinum toxin?  no  Total amount of toxin used:  30 units  Total amount of toxin wasted: 70 units      Problem List Items Addressed This Visit          Neuro    Hemifacial spasm    Overview     Left.  Receives botox [id number].  2019 MRI unremarkable.         Current Assessment & Plan     Doing well s/p injections    No overweakening          Liat Boyd MD, MS  [facility]  Department of Neurology  Movement Disorders

## 2022-09-09 DIAGNOSIS — G51.32 HEMIFACIAL SPASM OF LEFT SIDE OF FACE: Primary | ICD-10-CM

## 2022-12-16 ENCOUNTER — PROCEDURE VISIT (OUTPATIENT)
Dept: NEUROLOGY | Facility: CLINIC | Age: 62
End: 2022-12-16
Payer: COMMERCIAL

## 2022-12-16 ENCOUNTER — TELEPHONE (OUTPATIENT)
Dept: NEUROLOGY | Facility: CLINIC | Age: 62
End: 2022-12-16

## 2022-12-16 VITALS
DIASTOLIC BLOOD PRESSURE: 89 MMHG | WEIGHT: 155.13 LBS | BODY MASS INDEX: 30.46 KG/M2 | HEIGHT: 60 IN | HEART RATE: 79 BPM | SYSTOLIC BLOOD PRESSURE: 141 MMHG

## 2022-12-16 DIAGNOSIS — G51.32 HEMIFACIAL SPASM OF LEFT SIDE OF FACE: ICD-10-CM

## 2022-12-16 DIAGNOSIS — G51.32 HEMIFACIAL SPASM OF LEFT SIDE OF FACE: Primary | ICD-10-CM

## 2022-12-16 PROCEDURE — 64612 DESTROY NERVE FACE MUSCLE: CPT | Mod: LT,S$GLB,, | Performed by: PSYCHIATRY & NEUROLOGY

## 2022-12-16 PROCEDURE — 64612 PR DEST,NERVE,FACIAL: ICD-10-PCS | Mod: LT,S$GLB,, | Performed by: PSYCHIATRY & NEUROLOGY

## 2022-12-16 NOTE — PROCEDURES
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  Last injections helped.  No eyedroop noted. Some mouth droop. Decreased pulling around eye and mouth by 90%. It's helped for 3 months.  Required more for L cheek pulling    BOTOX injections are medically necessary for this patient, due to effect on the patient's quality of life, causing physical discomfort, social embarrassment, and disruption of occupational and daily activities.  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  Anticholinergics (artane) and benzodiazepines (klonopin) have not been effective.       PHYSICAL EXAM (FOCUSED):    Vitals:    12/16/22 1038   BP: (!) 141/89   Pulse: 79   Weight: 70.4 kg (155 lb 1.5 oz)   Height: 5' (1.524 m)     healthy, alert, no distress  Left hemifacial spasm that occurs multiple times/ minute during visit with complete left eye closure.  Mouth pulls to her left.    ASSESSMENT:  Left hemifacial spasm, appropriate for injections.    PLAN/PROCEDURE:  After explaining the most frequently reported adverse reactions in patients with primary blepharospasm and facial twitching following Botox injections were ptosis (20.8%), superficial punctate keratitis (6.3%) and eye dryness (6.3%). I answered all the patient's questions, and consent was obtained.     The goal of the injections will be to reduce spasms.  Using a 30 gauge injection needle and aseptic technique the following muscles were identified and injected with Botox.     BOTOX INJECTION PROCEDURE:   Dilution: 1ML 0.9% NORMAL  SALINE  UNITS BOTOX      Patients signed consent forms and we answered all questions about risks and benefits of botox injections. They agreed to the procedure.  ? L R   ?orbicularis oculi superior medial ?2.5 ?   ?orbicularis oculi superior lateral ?2.5 ?   orbicularis oculi lateral 2.5    ?orbicularis oculi inferior medial ?2.5 ?   ?orbicularis oculi inferior lateral 2.5    Orbicularis oris inferior     2.5    rhizorius 5    nasalis 2.5    mentalis 5    Orbicularis oris superior ?2.5 ?   The patient tolerated the procedure well and there were no immediate complications following the procedure.        Patient supplied botulinum toxin?  no  Total amount of toxin used:  30 units  Total amount of toxin wasted: 70 units      Problem List Items Addressed This Visit          Neuro    Hemifacial spasm    Overview     Left.  Receives botox [id number].  2019 MRI unremarkable.         Current Assessment & Plan     Doing well s/p injections  No overweakening  Continue Injections 3 Qmos        Liat Boyd MD, MS  [facility]  Department of Neurology  Movement Disorders

## 2022-12-16 NOTE — PROGRESS NOTES
Botox PA order entered and not linked to next appt.   Patient to update new insurance after 1/1/23.

## 2022-12-16 NOTE — TELEPHONE ENCOUNTER
----- Message from Liat Boyd MD sent at 12/16/2022 10:53 AM CST -----  Changing insurance ([facility]) Jan 1st- ongoing botox likely needs re auth

## 2023-01-12 ENCOUNTER — PATIENT MESSAGE (OUTPATIENT)
Dept: NEUROLOGY | Facility: CLINIC | Age: 63
End: 2023-01-12
Payer: COMMERCIAL

## 2023-02-07 ENCOUNTER — TELEPHONE (OUTPATIENT)
Dept: NEUROLOGY | Facility: CLINIC | Age: 63
End: 2023-02-07
Payer: COMMERCIAL

## 2023-02-07 NOTE — TELEPHONE ENCOUNTER
----- Message from Tremontana Chevalier sent at 2/7/2023  9:19 AM CST -----  Regarding: pt advice  # pt clling to spk with Dr. Boyd's office regarding pre approval of Botox injections. Referred pt to insurance as well. Pls cll pt @ [phone]

## 2023-02-07 NOTE — TELEPHONE ENCOUNTER
Spoke to the patient to let her know her approval will be viewed once her appointment is closer. Patient concerned about new insurance.

## 2023-03-16 ENCOUNTER — TELEPHONE (OUTPATIENT)
Dept: NEUROLOGY | Facility: CLINIC | Age: 63
End: 2023-03-16
Payer: COMMERCIAL

## 2023-03-16 NOTE — TELEPHONE ENCOUNTER
----- Message from Randi Aldana sent at 3/16/2023  9:45 AM CDT -----  Regarding: Appt Inquiry  Pt called to f/u pre approval of Botox injections.      Taylor madden pt @ [phone]

## 2023-03-17 ENCOUNTER — PROCEDURE VISIT (OUTPATIENT)
Dept: NEUROLOGY | Facility: CLINIC | Age: 63
End: 2023-03-17
Payer: COMMERCIAL

## 2023-03-17 DIAGNOSIS — G51.32 HEMIFACIAL SPASM OF LEFT SIDE OF FACE: ICD-10-CM

## 2023-03-17 PROCEDURE — 64612 DESTROY NERVE FACE MUSCLE: CPT | Mod: LT,S$GLB,, | Performed by: PSYCHIATRY & NEUROLOGY

## 2023-03-17 PROCEDURE — 64612 PR DEST,NERVE,FACIAL: ICD-10-PCS | Mod: LT,S$GLB,, | Performed by: PSYCHIATRY & NEUROLOGY

## 2023-03-17 NOTE — PROCEDURES
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  Last injections helped.  No eyedroop noted. Some mouth droop. Decreased pulling around eye and mouth by 90%. It's helped for 3 months.  Required more for L cheek pulling    BOTOX injections are medically necessary for this patient, due to effect on the patient's quality of life, causing physical discomfort, social embarrassment, and disruption of occupational and daily activities.  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  Anticholinergics (artane) and benzodiazepines (klonopin) have not been effective.       PHYSICAL EXAM (FOCUSED):    There were no vitals filed for this visit.    healthy, alert, no distress  Left hemifacial spasm that occurs multiple times/ minute during visit with complete left eye closure.  Mouth pulls to her left.    ASSESSMENT:  Left hemifacial spasm, appropriate for injections.    PLAN/PROCEDURE:  After explaining the most frequently reported adverse reactions in patients with primary blepharospasm and facial twitching following Botox injections were ptosis (20.8%), superficial punctate keratitis (6.3%) and eye dryness (6.3%). I answered all the patient's questions, and consent was obtained.     The goal of the injections will be to reduce spasms.  Using a 30 gauge injection needle and aseptic technique the following muscles were identified and injected with Botox.     BOTOX INJECTION PROCEDURE:   Dilution: 1ML 0.9% NORMAL SALINE  UNITS BOTOX      Patients signed consent forms and we  answered all questions about risks and benefits of botox injections. They agreed to the procedure.  ? L R   ?orbicularis oculi superior medial ?2.5 ?   ?orbicularis oculi superior lateral ?2.5 ?   orbicularis oculi lateral 2.5    ?orbicularis oculi inferior medial ?2.5 ?   ?orbicularis oculi inferior lateral 2.5    Orbicularis oris inferior     2.5    rhizorius 5+1    nasalis 2.5    mentalis 5    Orbicularis oris superior ?2.5 ?   The patient tolerated the procedure well and there were no immediate complications following the procedure.        Patient supplied botulinum toxin?  no  Total amount of toxin :  100 units  Total amount of toxin wasted: 69 units      Problem List Items Addressed This Visit          Neuro    Hemifacial spasm    Overview     Left.  Receives botox [id number].  2019 MRI unremarkable.         Current Assessment & Plan     Doing well s/p injections  No overweakening  Continue Injections 3-4 Qmos        Liat Boyd MD, MS  [facility]  Department of Neurology  Movement Disorders

## 2023-04-24 ENCOUNTER — PATIENT MESSAGE (OUTPATIENT)
Dept: NEUROLOGY | Facility: CLINIC | Age: 63
End: 2023-04-24
Payer: COMMERCIAL

## 2023-06-16 ENCOUNTER — PROCEDURE VISIT (OUTPATIENT)
Dept: NEUROLOGY | Facility: CLINIC | Age: 63
End: 2023-06-16
Payer: COMMERCIAL

## 2023-06-16 VITALS
WEIGHT: 158.19 LBS | BODY MASS INDEX: 31.06 KG/M2 | SYSTOLIC BLOOD PRESSURE: 132 MMHG | HEART RATE: 76 BPM | DIASTOLIC BLOOD PRESSURE: 84 MMHG | HEIGHT: 60 IN

## 2023-06-16 DIAGNOSIS — G51.32 HEMIFACIAL SPASM OF LEFT SIDE OF FACE: ICD-10-CM

## 2023-06-16 PROCEDURE — 67345 DESTROY NERVE OF EYE MUSCLE: CPT | Mod: LT,S$GLB,, | Performed by: PSYCHIATRY & NEUROLOGY

## 2023-06-16 PROCEDURE — 67345 PR DESTROY NERVE OF EYE MUSCLE: ICD-10-PCS | Mod: LT,S$GLB,, | Performed by: PSYCHIATRY & NEUROLOGY

## 2023-08-28 ENCOUNTER — TELEPHONE (OUTPATIENT)
Dept: NEUROLOGY | Facility: CLINIC | Age: 63
End: 2023-08-28
Payer: COMMERCIAL

## 2023-08-28 NOTE — TELEPHONE ENCOUNTER
Spoke with pt to r/s botox appt due to Dr. Boyd not being in clinic that morning. We r/s for 09/12/23 at 10:40 AM. Appt on hold, will get manager override.

## 2023-09-12 ENCOUNTER — PROCEDURE VISIT (OUTPATIENT)
Dept: NEUROLOGY | Facility: CLINIC | Age: 63
End: 2023-09-12
Payer: COMMERCIAL

## 2023-09-12 VITALS
BODY MASS INDEX: 30.14 KG/M2 | HEART RATE: 83 BPM | SYSTOLIC BLOOD PRESSURE: 128 MMHG | WEIGHT: 154.31 LBS | DIASTOLIC BLOOD PRESSURE: 90 MMHG

## 2023-09-12 DIAGNOSIS — G51.32 HEMIFACIAL SPASM OF LEFT SIDE OF FACE: ICD-10-CM

## 2023-09-12 PROCEDURE — 64612 PR DEST,NERVE,FACIAL: ICD-10-PCS | Mod: LT,S$GLB,, | Performed by: PSYCHIATRY & NEUROLOGY

## 2023-09-12 PROCEDURE — 64612 DESTROY NERVE FACE MUSCLE: CPT | Mod: LT,S$GLB,, | Performed by: PSYCHIATRY & NEUROLOGY

## 2023-09-12 NOTE — PROCEDURES
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  Last injections helped.  No eyedroop noted. Some mouth droop. Decreased pulling around eye and mouth by 90%. It's helped for 3 months.  Required more for L cheek pulling    BOTOX injections are medically necessary for this patient, due to effect on the patient's quality of life, causing physical discomfort, social embarrassment, and disruption of occupational and daily activities.  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  Anticholinergics (artane) and benzodiazepines (klonopin) have not been effective.       PHYSICAL EXAM (FOCUSED):    Vitals:    09/12/23 1045   BP: (!) 128/90   Pulse: 83   Weight: 70 kg (154 lb 5.2 oz)       healthy, alert, no distress  Left hemifacial spasm that occurs multiple times/ minute during visit with complete left eye closure.  Mouth pulls to her left.    ASSESSMENT:  Left hemifacial spasm, appropriate for injections.    PLAN/PROCEDURE:  After explaining the most frequently reported adverse reactions in patients with primary blepharospasm and facial twitching following Botox injections were ptosis (20.8%), superficial punctate keratitis (6.3%) and eye dryness (6.3%). I answered all the patient's questions, and consent was obtained.     The goal of the injections will be to reduce spasms.  Using a 30 gauge injection needle and aseptic technique the following muscles were identified and injected with Botox.     BOTOX INJECTION PROCEDURE:   Dilution: 1ML 0.9%  NORMAL SALINE  UNITS BOTOX      Patients signed consent forms and we answered all questions about risks and benefits of botox injections. They agreed to the procedure.  ? L R   ?orbicularis oculi superior medial ?2.5 ?   ?orbicularis oculi superior lateral ?2.5 ?   orbicularis oculi lateral 2.5    ?orbicularis oculi inferior medial ?2.5 ?   ?orbicularis oculi inferior lateral 2.5    Orbicularis oris inferior     2.5    rhizorius 5+1    nasalis 2.5    mentalis 5    Orbicularis oris superior - almost to nasolabial fold ?2.5 ?   The patient tolerated the procedure well and there were no immediate complications following the procedure.        Patient supplied botulinum toxin?  no  Total amount of toxin :  100 units  Total amount of toxin wasted: 69 units      Problem List Items Addressed This Visit          Neuro    Hemifacial spasm    Overview     Left.  Receives botox [id number].  2019 MRI unremarkable.         Current Assessment & Plan     Doing well s/p injections  No overweakening  Continue Injections 3-4 Qmos        Liat Boyd MD, MS [facility]  Department of Neurology  Movement Disorders

## 2023-10-17 DIAGNOSIS — G51.32 HEMIFACIAL SPASM OF LEFT SIDE OF FACE: Primary | ICD-10-CM

## 2023-12-15 ENCOUNTER — PROCEDURE VISIT (OUTPATIENT)
Dept: NEUROLOGY | Facility: CLINIC | Age: 63
End: 2023-12-15
Payer: COMMERCIAL

## 2023-12-15 VITALS
DIASTOLIC BLOOD PRESSURE: 91 MMHG | WEIGHT: 159.75 LBS | BODY MASS INDEX: 31.36 KG/M2 | HEART RATE: 88 BPM | SYSTOLIC BLOOD PRESSURE: 151 MMHG | HEIGHT: 60 IN

## 2023-12-15 DIAGNOSIS — G51.32 HEMIFACIAL SPASM OF LEFT SIDE OF FACE: Primary | ICD-10-CM

## 2023-12-15 PROCEDURE — 64612 PR DEST,NERVE,FACIAL: ICD-10-PCS | Mod: LT,S$GLB,, | Performed by: PSYCHIATRY & NEUROLOGY

## 2023-12-15 PROCEDURE — 64612 DESTROY NERVE FACE MUSCLE: CPT | Mod: LT,S$GLB,, | Performed by: PSYCHIATRY & NEUROLOGY

## 2023-12-15 NOTE — PROCEDURES
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  Last injections helped.  No eyedroop noted. Some mouth droop. Decreased pulling around eye and mouth by 90%. It's helped for 3 months.  Required more for L cheek pulling    BOTOX injections are medically necessary for this patient, due to effect on the patient's quality of life, causing physical discomfort, social embarrassment, and disruption of occupational and daily activities.  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  Anticholinergics (artane) and benzodiazepines (klonopin) have not been effective.       PHYSICAL EXAM (FOCUSED):    Vitals:    12/15/23 0847   BP: (!) 151/91   BP Location: Right arm   Patient Position: Sitting   BP Method: Medium (Automatic)   Pulse: 88   Weight: 72.4 kg (159 lb 11.6 oz)   Height: 5' (1.524 m)       healthy, alert, no distress  Left hemifacial spasm that occurs multiple times/ minute during visit with complete left eye closure.  Mouth pulls to her left.    ASSESSMENT:  Left hemifacial spasm, appropriate for injections.    PLAN/PROCEDURE:  After explaining the most frequently reported adverse reactions in patients with primary blepharospasm and facial twitching following Botox injections were ptosis (20.8%), superficial punctate keratitis (6.3%) and eye dryness (6.3%). I answered all the patient's questions, and consent was obtained.     The goal of the injections will be to reduce spasms.  Using a 30 gauge injection needle and aseptic technique the  following muscles were identified and injected with Botox.     BOTOX INJECTION PROCEDURE:   Dilution: 1ML 0.9% NORMAL SALINE  UNITS BOTOX      Patients signed consent forms and we answered all questions about risks and benefits of botox injections. They agreed to the procedure.  ? L R   ?orbicularis oculi superior medial ?2.5 ?   ?orbicularis oculi superior lateral ?2.5 ?   orbicularis oculi lateral 2.5    ?orbicularis oculi inferior medial ?2.5 ?   ?orbicularis oculi inferior lateral 2.5         Orbicularis oris inferior     2.5    rhizorius 6    nasalis 2.5    mentalis 5    Orbicularis oris superior - almost to nasolabial fold ?2.5 ?   The patient tolerated the procedure well and there were no immediate complications following the procedure.        Patient supplied botulinum toxin?  no  Total amount of toxin :  100 units  Total amount of toxin wasted: 69 units      Problem List Items Addressed This Visit          Neuro    Hemifacial spasm - Primary    Overview     Left.  Receives botox [id number].  2019 MRI unremarkable.         Current Assessment & Plan     Doing well s/p injections  No overweakening since last dose increased  Continue Injections 3-4 Qmos        Liat Boyd MD, MS [facility]  Department of Neurology  Movement Disorders

## 2023-12-15 NOTE — ASSESSMENT & PLAN NOTE
Doing well s/p injections  No overweakening since last dose increased  Continue Injections 3-4 Qmos

## 2024-02-06 ENCOUNTER — TELEPHONE (OUTPATIENT)
Dept: NEUROLOGY | Facility: CLINIC | Age: 64
End: 2024-02-06
Payer: COMMERCIAL

## 2024-03-22 ENCOUNTER — PROCEDURE VISIT (OUTPATIENT)
Dept: NEUROLOGY | Facility: CLINIC | Age: 64
End: 2024-03-22
Payer: COMMERCIAL

## 2024-03-22 VITALS
WEIGHT: 160.94 LBS | HEART RATE: 74 BPM | SYSTOLIC BLOOD PRESSURE: 152 MMHG | BODY MASS INDEX: 31.43 KG/M2 | DIASTOLIC BLOOD PRESSURE: 84 MMHG

## 2024-03-22 DIAGNOSIS — G51.32 HEMIFACIAL SPASM OF LEFT SIDE OF FACE: Primary | ICD-10-CM

## 2024-03-22 PROCEDURE — 64612 DESTROY NERVE FACE MUSCLE: CPT | Mod: LT,S$GLB,, | Performed by: PSYCHIATRY & NEUROLOGY

## 2024-03-22 RX ORDER — CLOBETASOL PROPIONATE 0.5 MG/G
OINTMENT TOPICAL
COMMUNITY
Start: 2024-02-21

## 2024-03-22 RX ORDER — ESTRADIOL 0.1 MG/G
CREAM VAGINAL
COMMUNITY
Start: 2024-02-22

## 2024-03-22 NOTE — PROCEDURES
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  Last injections helped.  No eyedroop noted. Some mouth droop. Decreased pulling around eye and mouth by 90%. It's helped for 3 months.  Required more for L cheek pulling    BOTOX injections are medically necessary for this patient, due to effect on the patient's quality of life, causing physical discomfort, social embarrassment, and disruption of occupational and daily activities.  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  Anticholinergics (artane) and benzodiazepines (klonopin) have not been effective.       PHYSICAL EXAM (FOCUSED):    Vitals:    03/22/24 0920   BP: (!) 152/84   BP Location: Right arm   Patient Position: Sitting   BP Method: Medium (Automatic)   Pulse: 74   Weight: 73 kg (160 lb 15 oz)       healthy, alert, no distress  Left hemifacial spasm that occurs multiple times/ minute during visit with complete left eye closure.  Mouth pulls to her left.    ASSESSMENT:  Left hemifacial spasm, appropriate for injections.    PLAN/PROCEDURE:  After explaining the most frequently reported adverse reactions in patients with primary blepharospasm and facial twitching following Botox injections were ptosis (20.8%), superficial punctate keratitis (6.3%) and eye dryness (6.3%). I answered all the patient's questions, and consent was obtained.     The goal of the injections will be to reduce spasms.  Using a 30 gauge injection needle and aseptic technique the following muscles were  identified and injected with Botox.     BOTOX INJECTION PROCEDURE:   Dilution: 1ML 0.9% NORMAL SALINE  UNITS BOTOX      Patients signed consent forms and we answered all questions about risks and benefits of botox injections. They agreed to the procedure.  ? L R   ?orbicularis oculi superior medial ?2.5 ?   ?orbicularis oculi superior lateral ?2.5 ?   orbicularis oculi lateral 2.5    ?orbicularis oculi inferior medial ?2.5 ?   ?orbicularis oculi inferior lateral 2.5         Orbicularis oris inferior     2.5    rhizorius 6    nasalis 2.5    mentalis 5    Orbicularis oris superior - almost to nasolabial fold ?2.5 ?   The patient tolerated the procedure well and there were no immediate complications following the procedure.        Patient supplied botulinum toxin?  no  Total amount of toxin :  100 units  Total amount of toxin wasted: 69 units      Problem List Items Addressed This Visit          Neuro    Hemifacial spasm - Primary    Overview     Left.  Receives botox [id number].  2019 MRI unremarkable.         Current Assessment & Plan     Doing well s/p injections  No overweakening since last dose increased  Continue Injections 3-4 Qmos        Liat Boyd MD, MS [facility]  Department of Neurology  Movement Disorders

## 2024-06-24 DIAGNOSIS — G51.32 HEMIFACIAL SPASM OF LEFT SIDE OF FACE: Primary | ICD-10-CM

## 2024-06-25 ENCOUNTER — TELEPHONE (OUTPATIENT)
Dept: NEUROLOGY | Facility: CLINIC | Age: 64
End: 2024-06-25
Payer: COMMERCIAL

## 2024-06-25 NOTE — TELEPHONE ENCOUNTER
Called patient today to let her know that she needs to be rescheduled. Patient picks up the call. I told her we need to reschedule her because her Botox has not been authorized yet. Patient was confused on why it has not be authorized I explain to the patient that every new shot of Botox has to go thought a 2 week authorization. Patient understands but was disappointed. I let her know that I will call her back with a date.

## 2024-07-01 ENCOUNTER — TELEPHONE (OUTPATIENT)
Dept: NEUROLOGY | Facility: CLINIC | Age: 64
End: 2024-07-01
Payer: COMMERCIAL

## 2024-07-01 NOTE — TELEPHONE ENCOUNTER
----- Message from Afsaneh Crum sent at 6/25/2024 12:13 PM CDT -----  Regarding: appt  Contact: [phone]  Pt stated her appt was cancelled due to not being authorized. Pt would like to speak to someone in regards to this. Pls call to discuss.

## 2024-07-01 NOTE — TELEPHONE ENCOUNTER
"Received call from pt with questions regarding Botox.  Called pt to discuss her questions as to why botox was cancelled last week when she called her insurance company and was told she's authorized.       "I was told my  Botox PA is good through March 2025"    Explained that we need to add authorization to an appointment and it wasn't clear at the time.    Communicated issue to Dr. Boyd and she offered an 11 am appointment for Botox on July 15.  Called pt back to see if she is available at this time.    Contacted Javier Carter to assist with scheduling.      "

## 2024-07-15 ENCOUNTER — PROCEDURE VISIT (OUTPATIENT)
Dept: NEUROLOGY | Facility: CLINIC | Age: 64
End: 2024-07-15
Payer: COMMERCIAL

## 2024-07-15 VITALS
SYSTOLIC BLOOD PRESSURE: 134 MMHG | HEIGHT: 60 IN | BODY MASS INDEX: 31.25 KG/M2 | DIASTOLIC BLOOD PRESSURE: 88 MMHG | HEART RATE: 83 BPM | WEIGHT: 159.19 LBS

## 2024-07-15 DIAGNOSIS — G51.32 HEMIFACIAL SPASM OF LEFT SIDE OF FACE: Primary | ICD-10-CM

## 2024-07-15 PROCEDURE — 64612 DESTROY NERVE FACE MUSCLE: CPT | Mod: LT,S$GLB,, | Performed by: PSYCHIATRY & NEUROLOGY

## 2024-07-15 NOTE — PROCEDURES
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  Last injections helped.  No eyedroop noted. Some mouth droop. Decreased pulling around eye and mouth by 90%. It's helped for 3 months.  Required more for L cheek pulling    BOTOX injections are medically necessary for this patient, due to effect on the patient's quality of life, causing physical discomfort, social embarrassment, and disruption of occupational and daily activities.  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  Anticholinergics (artane) and benzodiazepines (klonopin) have not been effective.       PHYSICAL EXAM (FOCUSED):    Vitals:    07/15/24 1053   BP: 134/88   BP Location: Right arm   Patient Position: Sitting   BP Method: Medium (Automatic)   Pulse: 83   Weight: 72.2 kg (159 lb 2.8 oz)   Height: 5' (1.524 m)       healthy, alert, no distress  Left hemifacial spasm that occurs multiple times/ minute during visit with complete left eye closure.  Mouth pulls to her left.    ASSESSMENT:  Left hemifacial spasm, appropriate for injections.    PLAN/PROCEDURE:  After explaining the most frequently reported adverse reactions in patients with primary blepharospasm and facial twitching following Botox injections were ptosis (20.8%), superficial punctate keratitis (6.3%) and eye dryness (6.3%). I answered all the patient's questions, and consent was obtained.     The goal of the injections will be to reduce spasms.  Using a 30 gauge injection needle and aseptic technique the following  muscles were identified and injected with Botox.     BOTOX INJECTION PROCEDURE:   Dilution: 1ML 0.9% NORMAL SALINE  UNITS BOTOX      Patients signed consent forms and we answered all questions about risks and benefits of botox injections. They agreed to the procedure.  ? L R   ?orbicularis oculi superior medial ?2.5 ?   ?orbicularis oculi superior lateral ?2.5 ?   orbicularis oculi lateral 2.5    ?orbicularis oculi inferior medial ?2.5 ?   ?orbicularis oculi inferior lateral 2.5         Orbicularis oris inferior     2.5    rhizorius 6    nasalis 2.5    mentalis 5    Orbicularis oris superior - almost to nasolabial fold ?2.5 ?   The patient tolerated the procedure well and there were no immediate complications following the procedure.        Patient supplied botulinum toxin?  no  Total amount of toxin :  100 units  Total amount of toxin wasted: 69 units      Problem List Items Addressed This Visit          Neuro    Hemifacial spasm - Primary    Overview     Left.  Receives botox [id number].  2019 MRI unremarkable.         Current Assessment & Plan     Doing well s/p injections  No overweakening since last dose increased  Continue Injections 3-4 Qmos          Liat Boyd MD, MS [facility]  Department of Neurology  Movement Disorders

## 2024-10-02 ENCOUNTER — TELEPHONE (OUTPATIENT)
Dept: NEUROLOGY | Facility: CLINIC | Age: 64
End: 2024-10-02
Payer: COMMERCIAL

## 2024-10-02 NOTE — TELEPHONE ENCOUNTER
----- Message from Ricardo sent at 9/27/2024 12:53 PM CDT -----  Regarding: Botox appointment  Contact: [phone]  Calling to schedule appointment due to Botox injection. She said she had an appointment for 11-05-24 but she don't see it in the portal. Please contact patient as soon as possible.

## 2024-10-30 ENCOUNTER — TELEPHONE (OUTPATIENT)
Dept: NEUROLOGY | Facility: CLINIC | Age: 64
End: 2024-10-30
Payer: COMMERCIAL

## 2024-10-31 ENCOUNTER — PROCEDURE VISIT (OUTPATIENT)
Dept: NEUROLOGY | Facility: CLINIC | Age: 64
End: 2024-10-31
Payer: COMMERCIAL

## 2024-10-31 VITALS
HEART RATE: 63 BPM | SYSTOLIC BLOOD PRESSURE: 126 MMHG | DIASTOLIC BLOOD PRESSURE: 81 MMHG | WEIGHT: 158.94 LBS | HEIGHT: 60 IN | BODY MASS INDEX: 31.2 KG/M2

## 2024-10-31 DIAGNOSIS — G51.32 HEMIFACIAL SPASM OF LEFT SIDE OF FACE: Primary | ICD-10-CM

## 2024-10-31 RX ORDER — HYDROCORTISONE 25 MG/G
OINTMENT TOPICAL 2 TIMES DAILY PRN
COMMUNITY
Start: 2024-09-10

## 2025-01-06 DIAGNOSIS — G51.32 HEMIFACIAL SPASM OF LEFT SIDE OF FACE: Primary | ICD-10-CM

## 2025-01-28 ENCOUNTER — PROCEDURE VISIT (OUTPATIENT)
Facility: CLINIC | Age: 65
End: 2025-01-28
Payer: COMMERCIAL

## 2025-01-28 VITALS
WEIGHT: 160.19 LBS | HEIGHT: 60 IN | BODY MASS INDEX: 31.45 KG/M2 | SYSTOLIC BLOOD PRESSURE: 135 MMHG | DIASTOLIC BLOOD PRESSURE: 81 MMHG | HEART RATE: 74 BPM

## 2025-01-28 DIAGNOSIS — G51.32 HEMIFACIAL SPASM OF LEFT SIDE OF FACE: Primary | ICD-10-CM

## 2025-01-28 PROCEDURE — 64612 DESTROY NERVE FACE MUSCLE: CPT | Mod: LT,S$GLB,, | Performed by: PSYCHIATRY & NEUROLOGY

## 2025-01-28 NOTE — PROCEDURES
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  Last injections helped.  No eyedroop noted. Some mouth droop. Decreased pulling around eye and mouth by 90%. It's helped for 3 months.  Required more for L cheek pulling    BOTOX injections are medically necessary for this patient, due to effect on the patient's quality of life, causing physical discomfort, social embarrassment, and disruption of occupational and daily activities.  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  Anticholinergics (artane) and benzodiazepines (klonopin) have not been effective.       PHYSICAL EXAM (FOCUSED):    Vitals:    01/28/25 1550   BP: 135/81   BP Location: Right arm   Patient Position: Sitting   Pulse: 74   Weight: 72.7 kg (160 lb 2.6 oz)   Height: 5' (1.524 m)       healthy, alert, no distress  Left hemifacial spasm that occurs multiple times/ minute during visit with complete left eye closure.  Mouth pulls to her left.    ASSESSMENT:  Left hemifacial spasm, appropriate for injections.    PLAN/PROCEDURE:  After explaining the most frequently reported adverse reactions in patients with primary blepharospasm and facial twitching following Botox injections were ptosis (20.8%), superficial punctate keratitis (6.3%) and eye dryness (6.3%). I answered all the patient's questions, and consent was obtained.     The goal of the injections will be to reduce spasms.  Using a 30 gauge injection needle and aseptic technique the following muscles were identified and injected with Botox.     BOTOX  INJECTION PROCEDURE:   Dilution: 1ML 0.9% NORMAL SALINE  UNITS BOTOX      Patients signed consent forms and we answered all questions about risks and benefits of botox injections. They agreed to the procedure.  ? L R   ?orbicularis oculi superior medial ?2.5 ?   ?orbicularis oculi superior lateral ?2.5 ?   orbicularis oculi lateral 2.5    ?orbicularis oculi inferior medial ?2.5 ?   ?orbicularis oculi inferior lateral 2.5    =======================     Orbicularis oris inferior     2.5    rhizorius 6    nasalis 2.5    mentalis 5    Orbicularis oris superior - almost to nasolabial fold ?2.5 ?   The patient tolerated the procedure well and there were no immediate complications following the procedure.        Patient supplied botulinum toxin?  no  Total amount of toxin :  100 units  Total amount of toxin wasted: 69 units      Problem List Items Addressed This Visit    None        Liat Boyd MD, MS [facility]  Department of Neurology  Movement Disorders

## 2025-01-29 ENCOUNTER — PATIENT MESSAGE (OUTPATIENT)
Facility: CLINIC | Age: 65
End: 2025-01-29
Payer: COMMERCIAL

## 2025-04-21 ENCOUNTER — PATIENT MESSAGE (OUTPATIENT)
Facility: CLINIC | Age: 65
End: 2025-04-21
Payer: COMMERCIAL

## 2025-04-21 ENCOUNTER — TELEPHONE (OUTPATIENT)
Dept: NEUROLOGY | Facility: CLINIC | Age: 65
End: 2025-04-21
Payer: COMMERCIAL

## 2025-04-21 NOTE — TELEPHONE ENCOUNTER
Called patient to inform her of her newly scheduled appointment. Patient confirmed her presence at the appointment

## 2025-05-06 DIAGNOSIS — G51.32 HEMIFACIAL SPASM OF LEFT SIDE OF FACE: Primary | ICD-10-CM

## 2025-05-30 ENCOUNTER — PROCEDURE VISIT (OUTPATIENT)
Facility: CLINIC | Age: 65
End: 2025-05-30
Payer: COMMERCIAL

## 2025-05-30 VITALS
BODY MASS INDEX: 29.02 KG/M2 | HEIGHT: 60 IN | HEART RATE: 77 BPM | DIASTOLIC BLOOD PRESSURE: 80 MMHG | SYSTOLIC BLOOD PRESSURE: 130 MMHG | WEIGHT: 147.81 LBS

## 2025-05-30 DIAGNOSIS — G51.32 HEMIFACIAL SPASM OF LEFT SIDE OF FACE: Primary | ICD-10-CM

## 2025-05-30 RX ORDER — MELATONIN 5 MG
TABLET, SUBLINGUAL SUBLINGUAL
COMMUNITY

## 2025-05-30 NOTE — PROCEDURES
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  Last injections helped.  No eyedroop noted. Some mouth droop. Decreased pulling around eye and mouth by 90%. It's helped for 3 months.  Required more for L cheek pulling    BOTOX injections are medically necessary for this patient, due to effect on the patient's quality of life, causing physical discomfort, social embarrassment, and disruption of occupational and daily activities.  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  Anticholinergics (artane) and benzodiazepines (klonopin) have not been effective.       PHYSICAL EXAM (FOCUSED):    Vitals:    05/30/25 1545   BP: 130/80   Pulse: 77   Weight: 67 kg (147 lb 13.1 oz)   Height: 5' (1.524 m)       healthy, alert, no distress  Left hemifacial spasm that occurs multiple times/ minute during visit with complete left eye closure.  Mouth pulls to her left.    ASSESSMENT:  Left hemifacial spasm, appropriate for injections.    PLAN/PROCEDURE:  After explaining the most frequently reported adverse reactions in patients with primary blepharospasm and facial twitching following Botox injections were ptosis (20.8%), superficial punctate keratitis (6.3%) and eye dryness (6.3%). I answered all the patient's questions, and consent was obtained.     The goal of the injections will be to reduce spasms.  Using a 30 gauge injection needle and aseptic technique the following muscles were identified and injected with Botox.     BOTOX INJECTION PROCEDURE:   Dilution: 1ML 0.9% NORMAL  SALINE  UNITS BOTOX      Patients signed consent forms and we answered all questions about risks and benefits of botox injections. They agreed to the procedure.  ? L R   ?orbicularis oculi superior medial ?2.5 ?   ?orbicularis oculi superior lateral ?2.5 ?   orbicularis oculi lateral 2.5    ?orbicularis oculi inferior medial ?2.5 ?   ?orbicularis oculi inferior lateral 2.5    =======================     Orbicularis oris inferior     2.5    rhizorius 6    nasalis 2.5    mentalis 5    Orbicularis oris superior - almost to nasolabial fold ?2.5 ?   The patient tolerated the procedure well and there were no immediate complications following the procedure.        Patient supplied botulinum toxin?  no  Total amount of toxin :  100 units  Total amount of toxin wasted: 69 units      Problem List Items Addressed This Visit          Neuro    Hemifacial spasm - Primary    Overview   Left.  Receives botox [id number].  2019 MRI unremarkable.         Current Assessment & Plan   Doing well s/p injections  No overweakening   Continue Injections 3-4 Qmos              Liat Boyd MD, MS [facility]  Department of Neurology  Movement Disorders

## 2025-06-02 ENCOUNTER — PATIENT MESSAGE (OUTPATIENT)
Facility: CLINIC | Age: 65
End: 2025-06-02
Payer: COMMERCIAL

## 2025-06-24 DIAGNOSIS — G51.32 HEMIFACIAL SPASM OF LEFT SIDE OF FACE: Primary | ICD-10-CM

## 2025-09-05 ENCOUNTER — PROCEDURE VISIT (OUTPATIENT)
Facility: CLINIC | Age: 65
End: 2025-09-05
Payer: COMMERCIAL

## 2025-09-05 VITALS
WEIGHT: 150.56 LBS | DIASTOLIC BLOOD PRESSURE: 86 MMHG | BODY MASS INDEX: 29.41 KG/M2 | SYSTOLIC BLOOD PRESSURE: 135 MMHG | HEART RATE: 69 BPM

## 2025-09-05 DIAGNOSIS — G51.32 HEMIFACIAL SPASM OF LEFT SIDE OF FACE: Primary | ICD-10-CM

## 2025-09-05 RX ORDER — TRETINOIN 0.25 MG/G
CREAM TOPICAL NIGHTLY
COMMUNITY